# Patient Record
Sex: FEMALE | Race: WHITE | Employment: UNEMPLOYED | URBAN - METROPOLITAN AREA
[De-identification: names, ages, dates, MRNs, and addresses within clinical notes are randomized per-mention and may not be internally consistent; named-entity substitution may affect disease eponyms.]

---

## 2020-06-21 ENCOUNTER — HOSPITAL ENCOUNTER (EMERGENCY)
Age: 40
Discharge: HOME | End: 2020-06-21

## 2020-06-21 VITALS
SYSTOLIC BLOOD PRESSURE: 134 MMHG | RESPIRATION RATE: 18 BRPM | BODY MASS INDEX: 39.51 KG/M2 | DIASTOLIC BLOOD PRESSURE: 73 MMHG | HEIGHT: 69 IN | WEIGHT: 266.76 LBS | OXYGEN SATURATION: 98 % | TEMPERATURE: 98 F | HEART RATE: 93 BPM

## 2020-06-21 PROCEDURE — 6370000000 HC RX 637 (ALT 250 FOR IP): Performed by: NURSE PRACTITIONER

## 2020-06-21 PROCEDURE — 99282 EMERGENCY DEPT VISIT SF MDM: CPT

## 2020-06-21 PROCEDURE — 2500000003 HC RX 250 WO HCPCS: Performed by: NURSE PRACTITIONER

## 2020-06-21 RX ORDER — BUPIVACAINE HYDROCHLORIDE 5 MG/ML
30 INJECTION, SOLUTION PERINEURAL ONCE
Status: COMPLETED | OUTPATIENT
Start: 2020-06-21 | End: 2020-06-21

## 2020-06-21 RX ORDER — LIDOCAINE 50 MG/G
1 PATCH TOPICAL DAILY
Qty: 30 PATCH | Refills: 0 | Status: SHIPPED | OUTPATIENT
Start: 2020-06-21

## 2020-06-21 RX ORDER — LIDOCAINE 4 G/G
1 PATCH TOPICAL DAILY
Status: DISCONTINUED | OUTPATIENT
Start: 2020-06-21 | End: 2020-06-21 | Stop reason: HOSPADM

## 2020-06-21 RX ORDER — HYDROCODONE BITARTRATE AND ACETAMINOPHEN 5; 325 MG/1; MG/1
1 TABLET ORAL ONCE
Status: COMPLETED | OUTPATIENT
Start: 2020-06-21 | End: 2020-06-21

## 2020-06-21 RX ORDER — PREDNISONE 20 MG/1
TABLET ORAL
Qty: 18 TABLET | Refills: 0 | Status: SHIPPED | OUTPATIENT
Start: 2020-06-21 | End: 2020-07-01

## 2020-06-21 RX ORDER — METHOCARBAMOL 500 MG/1
500 TABLET, FILM COATED ORAL 4 TIMES DAILY
Qty: 40 TABLET | Refills: 0 | Status: SHIPPED | OUTPATIENT
Start: 2020-06-21 | End: 2020-07-01

## 2020-06-21 RX ORDER — METHOCARBAMOL 500 MG/1
750 TABLET, FILM COATED ORAL ONCE
Status: COMPLETED | OUTPATIENT
Start: 2020-06-21 | End: 2020-06-21

## 2020-06-21 RX ADMIN — BUPIVACAINE HYDROCHLORIDE 150 MG: 5 INJECTION, SOLUTION PERINEURAL at 01:32

## 2020-06-21 RX ADMIN — HYDROCODONE BITARTRATE AND ACETAMINOPHEN 1 TABLET: 5; 325 TABLET ORAL at 01:31

## 2020-06-21 RX ADMIN — METHOCARBAMOL TABLETS 750 MG: 500 TABLET, COATED ORAL at 01:31

## 2020-06-21 ASSESSMENT — PAIN DESCRIPTION - LOCATION
LOCATION: SHOULDER

## 2020-06-21 ASSESSMENT — PAIN SCALES - GENERAL
PAINLEVEL_OUTOF10: 8

## 2020-06-21 ASSESSMENT — PAIN DESCRIPTION - ORIENTATION
ORIENTATION: LEFT

## 2020-06-21 ASSESSMENT — PAIN DESCRIPTION - FREQUENCY: FREQUENCY: CONTINUOUS

## 2020-06-21 ASSESSMENT — PAIN DESCRIPTION - ONSET: ONSET: ON-GOING

## 2020-06-21 ASSESSMENT — PAIN DESCRIPTION - DESCRIPTORS: DESCRIPTORS: THROBBING;SHARP

## 2020-06-21 ASSESSMENT — PAIN DESCRIPTION - PROGRESSION: CLINICAL_PROGRESSION: NOT CHANGED

## 2020-06-21 ASSESSMENT — PAIN - FUNCTIONAL ASSESSMENT: PAIN_FUNCTIONAL_ASSESSMENT: PREVENTS OR INTERFERES SOME ACTIVE ACTIVITIES AND ADLS

## 2020-06-21 ASSESSMENT — PAIN DESCRIPTION - PAIN TYPE
TYPE: CHRONIC PAIN
TYPE: ACUTE PAIN
TYPE: CHRONIC PAIN

## 2020-06-21 NOTE — ED NOTES
RN and NP Mary at bedside updating pt and family on discharge plan of care.       Cher Russell RN  06/21/20 0713

## 2020-06-21 NOTE — ED PROVIDER NOTES
[facility]  eMERGENCY dEPARTMENT eNCOUnter    Pt Name: @@  MRN: [id number]  Birthdate1980  Date of evaluation: 6/21/2020  Provider: PATEL Salazar CNP     Evaluated by the advanced practice provider    CHIEF COMPLAINT       Chief Complaint   Patient presents with    Shoulder Pain     Pt reports left shoulder pain that has \"been going on for 10 days\". Pt denies any injury and rates pain as a 8/10. HISTORY OF PRESENT ILLNESS  (Location/Symptom, Timing/Onset, Context/Setting, Quality, Duration, Modifying Factors, Severity.)   Eyal Herrera is a 38 y.o. female who presents to the emergency department complaining of left-sided neck pain that radiates to the left shoulder. And severe left shoulder pain that started 2 weeks ago after she rode on a [employer] bus from [place] to [place]. She states she woke up and noticed her neck was hurting and shoulder hurting at that time. It is only progressively gotten worse. It is made worse with her moving her neck, lying flat or trying to move the left shoulder joint. She is only taken Motrin. This is the first time that she has sought medical attention. Nursing Notes were reviewed and I agree. REVIEW OF SYSTEMS    (2-9 systems for level 4, 10 or more for level 5)     Review of Systems   Constitutional: Negative. Musculoskeletal: Positive for arthralgias and neck pain. All other systems reviewed and are negative. Except as noted above the remainder of the review of systems was reviewed and negative. PAST MEDICAL HISTORY         Diagnosis Date    Asthma     Hypertension        SURGICAL HISTORY     History reviewed. No pertinent surgical history. CURRENT MEDICATIONS       Discharge Medication List as of 6/21/2020  1:40 AM          ALLERGIES     Aspirin    FAMILY HISTORY     History reviewed. No pertinent family history. No family status information on file.         SOCIAL HISTORY BP: 134/73   Pulse: 93   Resp: 18   Temp: 98 °F (36.7 °C)   TempSrc: Oral   SpO2: 98%   Weight: 266 lb 12.1 oz (121 kg)   Height: 5' 9\" (1.753 m)     Medications   lidocaine 4 % external patch 1 patch (has no administration in time range)   bupivacaine (MARCAINE) 0.5 % injection 150 mg (150 mg Intradermal Given by Other 6/21/20 0132)   HYDROcodone-acetaminophen (NORCO) 5-325 MG per tablet 1 tablet (1 tablet Oral Given 6/21/20 0131)   methocarbamol (ROBAXIN) tablet 750 mg (750 mg Oral Given 6/21/20 0131)       MDM  She was seen and evaluated per myself. Dr. Neelam Simpson present available for consultation as needed. Clinical exam findings are consistent with a cervical radiculopathy. Her pain was reproducible with cervical range of motion as well as our range of motion of the shoulder. Pain radiates from the left mid to lower cervical spine upper thoracic spine across the trapezius and rhomboid to the outer left shoulder. No evidence of swelling. No evidence of erythema. No evidence of rash. There is been no trauma. I do not see an indication that warrants CT or an emergent MRI. I offered to perform a trigger point injection in the left trapezius region and the patient agreed. Lidoderm patch applied, Norco administered, muscle relaxant. Patient will be discharged home with [facility] spine referral, steroids, muscle relaxant and Lidoderm patch. Discussed my differential with the patient as well as the importance for her follow-up this is likely a radiculopathy secondary to nerve impingement that may be temporary or permanent and likely related to the way she slept on the [employer] bus. This dictation was performed with a verbal recognition program (DRAGON) and it was checked for errors. It is possible that there are still dictated errors within this office note. If so, please bring any errors to my attention for an addendum.  All efforts were made to ensure that this office note is accurate. PROCEDURES:  Procedures   Left trapezius trigger point injection performed. Site and anatomical landmarks identified. Procedure discussed with the patient. Verbal consent given. Site prepped with ChloraPrep. 23-gauge needle, 0.5% plain preservative-free bupivacaine  1.5 mL's injected. Area massaged afterwards. FINAL IMPRESSION      1. Cervical radiculopathy    2.  Acute pain of left shoulder          DISPOSITION/PLAN   DISPOSITION        PATIENT REFERRED TO:  [address] [place] [address], [address]    Spine specialist referral for likely cervical pinched nerve      DISCHARGE MEDICATIONS:  Discharge Medication List as of 6/21/2020  1:40 AM      START taking these medications    Details   lidocaine (LIDODERM) 5 % Place 1 patch onto the skin daily 12 hours on, 12 hours off., Disp-30 patch, R-0Print      methocarbamol (ROBAXIN) 500 MG tablet Take 1 tablet by mouth 4 times daily for 10 days, Disp-40 tablet, R-0Print      predniSONE (DELTASONE) 20 MG tablet 3 tabs po qam for 3 days then 2 tabs qam for 3 days the 1 tab qam for 3 days, Disp-18 tablet, R-0Print             (Please note that portions of this note werecompleted with a voice recognition program.  Efforts were made to edit the dictations but occasionally words are mis-transcribed.)    PATEL Truong - PATEL Bone - CNP  06/21/20 0152

## 2023-12-27 ENCOUNTER — APPOINTMENT (OUTPATIENT)
Dept: CT IMAGING | Age: 43
End: 2023-12-27
Payer: COMMERCIAL

## 2023-12-27 ENCOUNTER — HOSPITAL ENCOUNTER (EMERGENCY)
Age: 43
Discharge: HOME | End: 2023-12-27
Attending: EMERGENCY MEDICINE
Payer: COMMERCIAL

## 2023-12-27 VITALS
BODY MASS INDEX: 36.92 KG/M2 | SYSTOLIC BLOOD PRESSURE: 119 MMHG | RESPIRATION RATE: 15 BRPM | WEIGHT: 250 LBS | OXYGEN SATURATION: 95 % | TEMPERATURE: 98.1 F | HEART RATE: 97 BPM | DIASTOLIC BLOOD PRESSURE: 86 MMHG

## 2023-12-27 DIAGNOSIS — L02.31 ABSCESS OF RIGHT BUTTOCK: Primary | ICD-10-CM

## 2023-12-27 LAB
ALBUMIN SERPL-MCNC: 4.2 G/DL (ref 3.4–5)
ALBUMIN/GLOB SERPL: 1.2 {RATIO} (ref 1.1–2.2)
ALP SERPL-CCNC: 107 U/L (ref 40–129)
ALT SERPL-CCNC: 19 U/L (ref 10–40)
ANION GAP SERPL CALCULATED.3IONS-SCNC: 15 MMOL/L (ref 3–16)
AST SERPL-CCNC: 13 U/L (ref 15–37)
BASOPHILS # BLD: 0.3 K/UL (ref 0–0.2)
BASOPHILS NFR BLD: 1.4 %
BILIRUB SERPL-MCNC: 0.4 MG/DL (ref 0–1)
BUN SERPL-MCNC: 12 MG/DL (ref 7–20)
CALCIUM SERPL-MCNC: 9.3 MG/DL (ref 8.3–10.6)
CHLORIDE SERPL-SCNC: 99 MMOL/L (ref 99–110)
CO2 SERPL-SCNC: 22 MMOL/L (ref 21–32)
CREAT SERPL-MCNC: 1 MG/DL (ref 0.6–1.1)
DEPRECATED RDW RBC AUTO: 15.7 % (ref 12.4–15.4)
EOSINOPHIL # BLD: 0.4 K/UL (ref 0–0.6)
EOSINOPHIL NFR BLD: 1.7 %
GFR SERPLBLD CREATININE-BSD FMLA CKD-EPI: >60 ML/MIN/{1.73_M2}
GLUCOSE SERPL-MCNC: 266 MG/DL (ref 70–99)
HCG UR QL: NEGATIVE
HCT VFR BLD AUTO: 36 % (ref 36–48)
HGB BLD-MCNC: 12.3 G/DL (ref 12–16)
LACTATE BLDV-SCNC: 2.2 MMOL/L (ref 0.4–1.9)
LYMPHOCYTES # BLD: 3.3 K/UL (ref 1–5.1)
LYMPHOCYTES NFR BLD: 15.7 %
MCH RBC QN AUTO: 27.5 PG (ref 26–34)
MCHC RBC AUTO-ENTMCNC: 34.3 G/DL (ref 31–36)
MCV RBC AUTO: 80.3 FL (ref 80–100)
MONOCYTES # BLD: 1.5 K/UL (ref 0–1.3)
MONOCYTES NFR BLD: 7.1 %
NEUTROPHILS # BLD: 15.4 K/UL (ref 1.7–7.7)
NEUTROPHILS NFR BLD: 74.1 %
PLATELET # BLD AUTO: 337 K/UL (ref 135–450)
PMV BLD AUTO: 8.6 FL (ref 5–10.5)
POTASSIUM SERPL-SCNC: 4.1 MMOL/L (ref 3.5–5.1)
PROT SERPL-MCNC: 7.6 G/DL (ref 6.4–8.2)
RBC # BLD AUTO: 4.48 M/UL (ref 4–5.2)
SODIUM SERPL-SCNC: 136 MMOL/L (ref 136–145)
WBC # BLD AUTO: 20.8 K/UL (ref 4–11)

## 2023-12-27 PROCEDURE — 83605 ASSAY OF LACTIC ACID: CPT

## 2023-12-27 PROCEDURE — 80053 COMPREHEN METABOLIC PANEL: CPT

## 2023-12-27 PROCEDURE — 2500000003 HC RX 250 WO HCPCS: Performed by: EMERGENCY MEDICINE

## 2023-12-27 PROCEDURE — 84703 CHORIONIC GONADOTROPIN ASSAY: CPT

## 2023-12-27 PROCEDURE — 56405 I&D VULVA/PERINEAL ABSCESS: CPT

## 2023-12-27 PROCEDURE — 72193 CT PELVIS W/DYE: CPT

## 2023-12-27 PROCEDURE — 2580000003 HC RX 258: Performed by: EMERGENCY MEDICINE

## 2023-12-27 PROCEDURE — 6360000002 HC RX W HCPCS: Performed by: EMERGENCY MEDICINE

## 2023-12-27 PROCEDURE — 96375 TX/PRO/DX INJ NEW DRUG ADDON: CPT

## 2023-12-27 PROCEDURE — 6360000004 HC RX CONTRAST MEDICATION: Performed by: EMERGENCY MEDICINE

## 2023-12-27 PROCEDURE — 96365 THER/PROPH/DIAG IV INF INIT: CPT

## 2023-12-27 PROCEDURE — 85025 COMPLETE CBC W/AUTO DIFF WBC: CPT

## 2023-12-27 PROCEDURE — 87040 BLOOD CULTURE FOR BACTERIA: CPT

## 2023-12-27 PROCEDURE — 99285 EMERGENCY DEPT VISIT HI MDM: CPT

## 2023-12-27 RX ORDER — LIDOCAINE HYDROCHLORIDE AND EPINEPHRINE 10; 10 MG/ML; UG/ML
20 INJECTION, SOLUTION INFILTRATION; PERINEURAL ONCE
Status: COMPLETED | OUTPATIENT
Start: 2023-12-27 | End: 2023-12-27

## 2023-12-27 RX ORDER — CLINDAMYCIN HYDROCHLORIDE 300 MG/1
300 CAPSULE ORAL 3 TIMES DAILY
Qty: 30 CAPSULE | Refills: 0 | Status: SHIPPED | OUTPATIENT
Start: 2023-12-27 | End: 2024-01-06

## 2023-12-27 RX ORDER — MORPHINE SULFATE 4 MG/ML
4 INJECTION, SOLUTION INTRAMUSCULAR; INTRAVENOUS ONCE
Status: DISCONTINUED | OUTPATIENT
Start: 2023-12-27 | End: 2023-12-27

## 2023-12-27 RX ORDER — ONDANSETRON 2 MG/ML
4 INJECTION INTRAMUSCULAR; INTRAVENOUS ONCE
Status: COMPLETED | OUTPATIENT
Start: 2023-12-27 | End: 2023-12-27

## 2023-12-27 RX ORDER — CLINDAMYCIN PHOSPHATE 600 MG/50ML
600 INJECTION, SOLUTION INTRAVENOUS ONCE
Status: DISCONTINUED | OUTPATIENT
Start: 2023-12-27 | End: 2023-12-27

## 2023-12-27 RX ORDER — CLINDAMYCIN PHOSPHATE 600 MG/50ML
600 INJECTION, SOLUTION INTRAVENOUS ONCE
Status: COMPLETED | OUTPATIENT
Start: 2023-12-27 | End: 2023-12-27

## 2023-12-27 RX ORDER — 0.9 % SODIUM CHLORIDE 0.9 %
2410 INTRAVENOUS SOLUTION INTRAVENOUS ONCE
Status: COMPLETED | OUTPATIENT
Start: 2023-12-27 | End: 2023-12-27

## 2023-12-27 RX ORDER — 0.9 % SODIUM CHLORIDE 0.9 %
1000 INTRAVENOUS SOLUTION INTRAVENOUS ONCE
Status: DISCONTINUED | OUTPATIENT
Start: 2023-12-27 | End: 2023-12-27 | Stop reason: HOSPADM

## 2023-12-27 RX ORDER — OXYCODONE HYDROCHLORIDE AND ACETAMINOPHEN 5; 325 MG/1; MG/1
1 TABLET ORAL EVERY 6 HOURS PRN
Qty: 8 TABLET | Refills: 0 | Status: SHIPPED | OUTPATIENT
Start: 2023-12-27 | End: 2023-12-29

## 2023-12-27 RX ADMIN — HYDROMORPHONE HYDROCHLORIDE 0.5 MG: 1 INJECTION, SOLUTION INTRAMUSCULAR; INTRAVENOUS; SUBCUTANEOUS at 20:26

## 2023-12-27 RX ADMIN — CLINDAMYCIN PHOSPHATE 600 MG: 600 INJECTION, SOLUTION INTRAVENOUS at 20:19

## 2023-12-27 RX ADMIN — SODIUM CHLORIDE 2410 ML: 9 INJECTION, SOLUTION INTRAVENOUS at 18:42

## 2023-12-27 RX ADMIN — HYDROMORPHONE HYDROCHLORIDE 0.5 MG: 1 INJECTION, SOLUTION INTRAMUSCULAR; INTRAVENOUS; SUBCUTANEOUS at 18:42

## 2023-12-27 RX ADMIN — ONDANSETRON 4 MG: 2 INJECTION INTRAMUSCULAR; INTRAVENOUS at 18:42

## 2023-12-27 RX ADMIN — IOPAMIDOL 75 ML: 755 INJECTION, SOLUTION INTRAVENOUS at 18:31

## 2023-12-27 RX ADMIN — LIDOCAINE HYDROCHLORIDE,EPINEPHRINE BITARTRATE 20 ML: 10; .01 INJECTION, SOLUTION INFILTRATION; PERINEURAL at 20:17

## 2023-12-27 NOTE — ED PROVIDER NOTES
[facility]  eMERGENCY dEPARTMENT eNCOUnter      Pt Name: Jimmy Pineda  MRN: [id number]  [facility] 1980  Date of evaluation: 12/27/2023  Provider: Sonya Pacheco MD    CHIEF COMPLAINT       Chief Complaint   Patient presents with    Abscess     Pt presents with an abscess on her buttocks. CRITICAL CARE TIME   Total Critical Care time was 0 minutes, excluding separately reportable procedures. There was a high probability of clinically significant/life threatening deterioration in the patient's condition which required my urgent intervention. HISTORY OF PRESENT ILLNESS  (Location/Symptom, Timing/Onset, Context/Setting, Quality, Duration, Modifying Factors, Severity.)   History From: Patient  Limitations to history : None    Jimmy Pineda is a 39 y.o. female who presents to the emergency department complaining of a buttock abscess. She states she has had pain and swelling for about a week. She has noticed some drainage. She felt like she had a fever at home. No nausea or vomiting. She is not a diabetic. She states she has had previous MRSA infection in the past.      Nursing Notes were reviewed and I agree. SCREENINGS        Deejay Coma Scale  Eye Opening: Spontaneous  Best Verbal Response: Oriented  Best Motor Response: Obeys commands  Stilwell Coma Scale Score: 15                CIWA Assessment  BP: 113/62  Pulse: 96           REVIEW OF SYSTEMS    (2-9 systems for level 4, 10 or more for level 5)     General: Subjective fever. HEENT: No earache rhinorrhea or sore throat. Cardiovascular: No chest pain. Pulmonary: No shortness of breath or cough. GI: No abdominal pain nausea or vomiting. Skin: Pain in her perianal and perineal area. Purulent drainage. Pain for about a week. Except as noted above the remainder of the review of systems was reviewed and negative.        PAST MEDICAL HISTORY     Past Medical History:   Diagnosis Date

## 2023-12-28 NOTE — ED NOTES
Pt requesting more pain and nausea medication. Dr Shakeel Cordero notified. No new orders at this time.

## 2023-12-28 NOTE — DISCHARGE INSTRUCTIONS
Clean area 2 times daily with soap and water. Keep covered with dressing for the next several days. Take antibiotic as prescribed until completed. If Tylenol or ibuprofen does not relieve your pain you can take the Percocet as prescribed. Follow-up in 3 to 5 days for recheck. Packing should be removed in 3 days.

## 2023-12-31 LAB
BACTERIA BLD CULT ORG #2: NORMAL
BACTERIA BLD CULT: NORMAL

## 2024-06-11 ENCOUNTER — APPOINTMENT (OUTPATIENT)
Dept: CT IMAGING | Age: 44
DRG: 720 | End: 2024-06-11
Attending: EMERGENCY MEDICINE
Payer: COMMERCIAL

## 2024-06-11 ENCOUNTER — APPOINTMENT (OUTPATIENT)
Dept: CT IMAGING | Age: 44
DRG: 720 | End: 2024-06-11
Payer: COMMERCIAL

## 2024-06-11 ENCOUNTER — APPOINTMENT (OUTPATIENT)
Dept: GENERAL RADIOLOGY | Age: 44
DRG: 720 | End: 2024-06-11
Payer: COMMERCIAL

## 2024-06-11 ENCOUNTER — ANESTHESIA (OUTPATIENT)
Dept: OPERATING ROOM | Age: 44
DRG: 720 | End: 2024-06-11
Payer: COMMERCIAL

## 2024-06-11 ENCOUNTER — HOSPITAL ENCOUNTER (INPATIENT)
Age: 44
LOS: 1 days | Discharge: ANOTHER HOSPITAL | DRG: 720 | End: 2024-06-12
Attending: EMERGENCY MEDICINE | Admitting: HOSPITALIST
Payer: COMMERCIAL

## 2024-06-11 ENCOUNTER — ANESTHESIA EVENT (OUTPATIENT)
Dept: OPERATING ROOM | Age: 44
DRG: 720 | End: 2024-06-11
Payer: COMMERCIAL

## 2024-06-11 DIAGNOSIS — R73.9 HYPERGLYCEMIA: ICD-10-CM

## 2024-06-11 DIAGNOSIS — R06.02 SHORTNESS OF BREATH: ICD-10-CM

## 2024-06-11 DIAGNOSIS — I48.91 ATRIAL FIBRILLATION WITH RAPID VENTRICULAR RESPONSE (HCC): ICD-10-CM

## 2024-06-11 DIAGNOSIS — G81.94 ACUTE LEFT HEMIPARESIS (HCC): ICD-10-CM

## 2024-06-11 DIAGNOSIS — I70.209 ARTERIAL OCCLUSION, LOWER EXTREMITY (HCC): ICD-10-CM

## 2024-06-11 DIAGNOSIS — R41.82 ALTERED MENTAL STATUS, UNSPECIFIED ALTERED MENTAL STATUS TYPE: ICD-10-CM

## 2024-06-11 DIAGNOSIS — R91.8 LUNG MASS: ICD-10-CM

## 2024-06-11 DIAGNOSIS — E87.1 HYPONATREMIA: ICD-10-CM

## 2024-06-11 DIAGNOSIS — J18.9 PNEUMONIA OF BOTH LUNGS DUE TO INFECTIOUS ORGANISM, UNSPECIFIED PART OF LUNG: ICD-10-CM

## 2024-06-11 DIAGNOSIS — D73.5 SPLENIC INFARCT: ICD-10-CM

## 2024-06-11 DIAGNOSIS — R56.9 SEIZURE (HCC): Primary | ICD-10-CM

## 2024-06-11 DIAGNOSIS — N28.0 RENAL INFARCT (HCC): ICD-10-CM

## 2024-06-11 DIAGNOSIS — J96.00 ACUTE RESPIRATORY FAILURE, UNSPECIFIED WHETHER WITH HYPOXIA OR HYPERCAPNIA (HCC): ICD-10-CM

## 2024-06-11 LAB
ALBUMIN SERPL-MCNC: 3.3 G/DL (ref 3.4–5)
ALBUMIN/GLOB SERPL: 0.8 {RATIO} (ref 1.1–2.2)
ALP SERPL-CCNC: 117 U/L (ref 40–129)
ALT SERPL-CCNC: 15 U/L (ref 10–40)
AMPHETAMINES UR QL SCN>1000 NG/ML: ABNORMAL
ANION GAP SERPL CALCULATED.3IONS-SCNC: 12 MMOL/L (ref 3–16)
ANION GAP SERPL CALCULATED.3IONS-SCNC: 15 MMOL/L (ref 3–16)
ANION GAP SERPL CALCULATED.3IONS-SCNC: 26 MMOL/L (ref 3–16)
APTT BLD: 95.7 SEC (ref 22.1–36.4)
AST SERPL-CCNC: 17 U/L (ref 15–37)
BARBITURATES UR QL SCN>200 NG/ML: ABNORMAL
BASE EXCESS BLDA CALC-SCNC: -9 MMOL/L (ref -3–3)
BASE EXCESS BLDV CALC-SCNC: -11.2 MMOL/L
BASE EXCESS BLDV CALC-SCNC: -12.8 MMOL/L
BENZODIAZ UR QL SCN>200 NG/ML: POSITIVE
BETA-HYDROXYBUTYRATE: 3.1 MMOL/L (ref 0–0.27)
BILIRUB SERPL-MCNC: 0.8 MG/DL (ref 0–1)
BILIRUB UR QL STRIP.AUTO: NEGATIVE
BUN SERPL-MCNC: 14 MG/DL (ref 7–20)
BUN SERPL-MCNC: 16 MG/DL (ref 7–20)
BUN SERPL-MCNC: 17 MG/DL (ref 7–20)
CA-I BLD-SCNC: 1.14 MMOL/L (ref 1.12–1.32)
CALCIUM SERPL-MCNC: 7.5 MG/DL (ref 8.3–10.6)
CALCIUM SERPL-MCNC: 7.7 MG/DL (ref 8.3–10.6)
CALCIUM SERPL-MCNC: 8.8 MG/DL (ref 8.3–10.6)
CANNABINOIDS UR QL SCN>50 NG/ML: POSITIVE
CHARACTER UR: NORMAL
CHLORIDE SERPL-SCNC: 103 MMOL/L (ref 99–110)
CHLORIDE SERPL-SCNC: 107 MMOL/L (ref 99–110)
CHLORIDE SERPL-SCNC: 85 MMOL/L (ref 99–110)
CLARITY UR: CLEAR
CO2 BLDA-SCNC: 18 MMOL/L
CO2 BLDV-SCNC: 16 MMOL/L
CO2 BLDV-SCNC: 18 MMOL/L
CO2 SERPL-SCNC: 14 MMOL/L (ref 21–32)
CO2 SERPL-SCNC: 17 MMOL/L (ref 21–32)
CO2 SERPL-SCNC: 19 MMOL/L (ref 21–32)
COCAINE UR QL SCN: ABNORMAL
COHGB MFR BLDV: 1.4 %
COHGB MFR BLDV: 1.6 %
COLOR UR: YELLOW
CREAT SERPL-MCNC: 1.3 MG/DL (ref 0.6–1.1)
CREAT SERPL-MCNC: 1.4 MG/DL (ref 0.6–1.1)
CREAT SERPL-MCNC: 1.4 MG/DL (ref 0.6–1.1)
DEPRECATED RDW RBC AUTO: 17.9 % (ref 12.4–15.4)
DRUG SCREEN COMMENT UR-IMP: ABNORMAL
EKG DIAGNOSIS: NORMAL
EKG Q-T INTERVAL: 236 MS
EKG QRS DURATION: 86 MS
EKG QTC CALCULATION (BAZETT): 419 MS
EKG R AXIS: 11 DEGREES
EKG T AXIS: 181 DEGREES
EKG VENTRICULAR RATE: 190 BPM
EPI CELLS #/AREA URNS HPF: NORMAL /HPF (ref 0–5)
ETHANOLAMINE SERPL-MCNC: NORMAL MG/DL (ref 0–0.08)
FENTANYL SCREEN, URINE: ABNORMAL
GFR SERPLBLD CREATININE-BSD FMLA CKD-EPI: 47 ML/MIN/{1.73_M2}
GFR SERPLBLD CREATININE-BSD FMLA CKD-EPI: 47 ML/MIN/{1.73_M2}
GFR SERPLBLD CREATININE-BSD FMLA CKD-EPI: 52 ML/MIN/{1.73_M2}
GLUCOSE BLD-MCNC: 261 MG/DL (ref 70–99)
GLUCOSE BLD-MCNC: 278 MG/DL (ref 70–99)
GLUCOSE BLD-MCNC: 305 MG/DL (ref 70–99)
GLUCOSE BLD-MCNC: 389 MG/DL (ref 70–99)
GLUCOSE BLD-MCNC: 442 MG/DL (ref 70–99)
GLUCOSE BLD-MCNC: 541 MG/DL (ref 70–99)
GLUCOSE BLD-MCNC: 660 MG/DL (ref 70–99)
GLUCOSE BLD-MCNC: >600 MG/DL (ref 70–99)
GLUCOSE SERPL-MCNC: 242 MG/DL (ref 70–99)
GLUCOSE SERPL-MCNC: 495 MG/DL (ref 70–99)
GLUCOSE SERPL-MCNC: 788 MG/DL (ref 70–99)
GLUCOSE SERPL-MCNC: 916 MG/DL (ref 70–99)
GLUCOSE UR STRIP.AUTO-MCNC: >=1000 MG/DL
HCG SERPL QL: NEGATIVE
HCO3 BLDA-SCNC: 16.7 MMOL/L (ref 21–29)
HCO3 BLDV-SCNC: 15 MMOL/L (ref 23–29)
HCO3 BLDV-SCNC: 16 MMOL/L (ref 23–29)
HCT VFR BLD AUTO: 32 % (ref 36–48)
HCT VFR BLD AUTO: 34.6 % (ref 36–48)
HGB BLD CALC-MCNC: 11 GM/DL (ref 12–16)
HGB BLD-MCNC: 10.7 G/DL (ref 12–16)
HGB UR QL STRIP.AUTO: ABNORMAL
INR PPP: 1.27 (ref 0.85–1.15)
KETONES UR STRIP.AUTO-MCNC: 15 MG/DL
LACTATE BLD-SCNC: 4.74 MMOL/L (ref 0.4–2)
LACTATE BLDV-SCNC: 2 MMOL/L (ref 0.4–2)
LACTATE BLDV-SCNC: 2.5 MMOL/L (ref 0.4–2)
LACTATE BLDV-SCNC: 4 MMOL/L (ref 0.4–2)
LACTATE BLDV-SCNC: 6.5 MMOL/L (ref 0.4–1.9)
LACTATE BLDV-SCNC: 9.1 MMOL/L (ref 0.4–1.9)
LEUKOCYTE ESTERASE UR QL STRIP.AUTO: NEGATIVE
LIPASE SERPL-CCNC: 45 U/L (ref 13–60)
MAGNESIUM SERPL-MCNC: 2.1 MG/DL (ref 1.8–2.4)
MAGNESIUM SERPL-MCNC: 2.2 MG/DL (ref 1.8–2.4)
MCH RBC QN AUTO: 25.7 PG (ref 26–34)
MCHC RBC AUTO-ENTMCNC: 30.8 G/DL (ref 31–36)
MCV RBC AUTO: 83.4 FL (ref 80–100)
METHADONE UR QL SCN>300 NG/ML: ABNORMAL
METHGB MFR BLDV: 0.5 %
METHGB MFR BLDV: 0.5 %
NITRITE UR QL STRIP.AUTO: NEGATIVE
NT-PROBNP SERPL-MCNC: 4262 PG/ML (ref 0–124)
O2 THERAPY: ABNORMAL
O2 THERAPY: ABNORMAL
OPIATES UR QL SCN>300 NG/ML: ABNORMAL
OXYCODONE UR QL SCN: ABNORMAL
PCO2 BLDA: 32.8 MM HG (ref 35–45)
PCO2 BLDV: 35.6 MMHG (ref 40–50)
PCO2 BLDV: 50.1 MMHG (ref 40–50)
PCP UR QL SCN>25 NG/ML: ABNORMAL
PERFORMED ON: ABNORMAL
PH BLDA: 7.32 [PH] (ref 7.35–7.45)
PH BLDV: 7.12 [PH] (ref 7.35–7.45)
PH BLDV: 7.24 [PH] (ref 7.35–7.45)
PH UR STRIP.AUTO: 6 [PH] (ref 5–8)
PH UR STRIP: 5 [PH]
PHOSPHATE SERPL-MCNC: 0.8 MG/DL (ref 2.5–4.9)
PHOSPHATE SERPL-MCNC: 1.3 MG/DL (ref 2.5–4.9)
PLATELET # BLD AUTO: 94 K/UL (ref 135–450)
PLATELET BLD QL SMEAR: ABNORMAL
PMV BLD AUTO: 11.1 FL (ref 5–10.5)
PO2 BLDA: 155.2 MM HG (ref 75–108)
PO2 BLDV: 57 MMHG
PO2 BLDV: 86 MMHG
POC SAMPLE TYPE: ABNORMAL
POTASSIUM BLD-SCNC: 3.3 MMOL/L (ref 3.5–5.1)
POTASSIUM SERPL-SCNC: 3.1 MMOL/L (ref 3.5–5.1)
POTASSIUM SERPL-SCNC: 3.8 MMOL/L (ref 3.5–5.1)
POTASSIUM SERPL-SCNC: 3.8 MMOL/L (ref 3.5–5.1)
PROT SERPL-MCNC: 7.6 G/DL (ref 6.4–8.2)
PROT UR STRIP.AUTO-MCNC: 30 MG/DL
PROTHROMBIN TIME: 16.1 SEC (ref 11.9–14.9)
RBC # BLD AUTO: 4.15 M/UL (ref 4–5.2)
RBC #/AREA URNS HPF: NORMAL /HPF (ref 0–4)
SAO2 % BLDA: 99 % (ref 93–100)
SAO2 % BLDV: 81 %
SAO2 % BLDV: 96 %
SLIDE REVIEW: ABNORMAL
SODIUM BLD-SCNC: 139 MMOL/L (ref 136–145)
SODIUM SERPL-SCNC: 125 MMOL/L (ref 136–145)
SODIUM SERPL-SCNC: 135 MMOL/L (ref 136–145)
SODIUM SERPL-SCNC: 138 MMOL/L (ref 136–145)
SP GR UR STRIP.AUTO: 1.03 (ref 1–1.03)
TROPONIN, HIGH SENSITIVITY: 33 NG/L (ref 0–14)
TROPONIN, HIGH SENSITIVITY: 36 NG/L (ref 0–14)
UA COMPLETE W REFLEX CULTURE PNL UR: ABNORMAL
UA DIPSTICK W REFLEX MICRO PNL UR: YES
URN SPEC COLLECT METH UR: ABNORMAL
UROBILINOGEN UR STRIP-ACNC: 0.2 E.U./DL
WBC # BLD AUTO: 21.1 K/UL (ref 4–11)
WBC #/AREA URNS HPF: NORMAL /HPF (ref 0–5)

## 2024-06-11 PROCEDURE — 84100 ASSAY OF PHOSPHORUS: CPT

## 2024-06-11 PROCEDURE — 80053 COMPREHEN METABOLIC PANEL: CPT

## 2024-06-11 PROCEDURE — 96361 HYDRATE IV INFUSION ADD-ON: CPT

## 2024-06-11 PROCEDURE — 80307 DRUG TEST PRSMV CHEM ANLYZR: CPT

## 2024-06-11 PROCEDURE — 2580000003 HC RX 258: Performed by: EMERGENCY MEDICINE

## 2024-06-11 PROCEDURE — 93005 ELECTROCARDIOGRAM TRACING: CPT | Performed by: EMERGENCY MEDICINE

## 2024-06-11 PROCEDURE — 99253 IP/OBS CNSLTJ NEW/EST LOW 45: CPT | Performed by: SURGERY

## 2024-06-11 PROCEDURE — 71045 X-RAY EXAM CHEST 1 VIEW: CPT

## 2024-06-11 PROCEDURE — 96375 TX/PRO/DX INJ NEW DRUG ADDON: CPT

## 2024-06-11 PROCEDURE — 83036 HEMOGLOBIN GLYCOSYLATED A1C: CPT

## 2024-06-11 PROCEDURE — 6360000002 HC RX W HCPCS: Performed by: HOSPITALIST

## 2024-06-11 PROCEDURE — 5A1935Z RESPIRATORY VENTILATION, LESS THAN 24 CONSECUTIVE HOURS: ICD-10-PCS | Performed by: HOSPITALIST

## 2024-06-11 PROCEDURE — 3E043XZ INTRODUCTION OF VASOPRESSOR INTO CENTRAL VEIN, PERCUTANEOUS APPROACH: ICD-10-PCS | Performed by: HOSPITALIST

## 2024-06-11 PROCEDURE — 87150 DNA/RNA AMPLIFIED PROBE: CPT

## 2024-06-11 PROCEDURE — 85027 COMPLETE CBC AUTOMATED: CPT

## 2024-06-11 PROCEDURE — 6360000002 HC RX W HCPCS: Performed by: EMERGENCY MEDICINE

## 2024-06-11 PROCEDURE — 87040 BLOOD CULTURE FOR BACTERIA: CPT

## 2024-06-11 PROCEDURE — 81001 URINALYSIS AUTO W/SCOPE: CPT

## 2024-06-11 PROCEDURE — 2500000003 HC RX 250 WO HCPCS: Performed by: EMERGENCY MEDICINE

## 2024-06-11 PROCEDURE — 83880 ASSAY OF NATRIURETIC PEPTIDE: CPT

## 2024-06-11 PROCEDURE — 03HY32Z INSERTION OF MONITORING DEVICE INTO UPPER ARTERY, PERCUTANEOUS APPROACH: ICD-10-PCS | Performed by: HOSPITALIST

## 2024-06-11 PROCEDURE — 2700000000 HC OXYGEN THERAPY PER DAY

## 2024-06-11 PROCEDURE — 93010 ELECTROCARDIOGRAM REPORT: CPT | Performed by: INTERNAL MEDICINE

## 2024-06-11 PROCEDURE — 82010 KETONE BODYS QUAN: CPT

## 2024-06-11 PROCEDURE — 85014 HEMATOCRIT: CPT

## 2024-06-11 PROCEDURE — 96376 TX/PRO/DX INJ SAME DRUG ADON: CPT

## 2024-06-11 PROCEDURE — 2000000000 HC ICU R&B

## 2024-06-11 PROCEDURE — 87186 SC STD MICRODIL/AGAR DIL: CPT

## 2024-06-11 PROCEDURE — 94640 AIRWAY INHALATION TREATMENT: CPT

## 2024-06-11 PROCEDURE — 82077 ASSAY SPEC XCP UR&BREATH IA: CPT

## 2024-06-11 PROCEDURE — 82947 ASSAY GLUCOSE BLOOD QUANT: CPT

## 2024-06-11 PROCEDURE — 85610 PROTHROMBIN TIME: CPT

## 2024-06-11 PROCEDURE — 96365 THER/PROPH/DIAG IV INF INIT: CPT

## 2024-06-11 PROCEDURE — 6360000002 HC RX W HCPCS: Performed by: STUDENT IN AN ORGANIZED HEALTH CARE EDUCATION/TRAINING PROGRAM

## 2024-06-11 PROCEDURE — 2500000003 HC RX 250 WO HCPCS: Performed by: INTERNAL MEDICINE

## 2024-06-11 PROCEDURE — 6370000000 HC RX 637 (ALT 250 FOR IP): Performed by: EMERGENCY MEDICINE

## 2024-06-11 PROCEDURE — 85730 THROMBOPLASTIN TIME PARTIAL: CPT

## 2024-06-11 PROCEDURE — 84132 ASSAY OF SERUM POTASSIUM: CPT

## 2024-06-11 PROCEDURE — 72125 CT NECK SPINE W/O DYE: CPT

## 2024-06-11 PROCEDURE — 99291 CRITICAL CARE FIRST HOUR: CPT

## 2024-06-11 PROCEDURE — 94002 VENT MGMT INPAT INIT DAY: CPT

## 2024-06-11 PROCEDURE — 36620 INSERTION CATHETER ARTERY: CPT

## 2024-06-11 PROCEDURE — 2500000003 HC RX 250 WO HCPCS: Performed by: HOSPITALIST

## 2024-06-11 PROCEDURE — 83690 ASSAY OF LIPASE: CPT

## 2024-06-11 PROCEDURE — 96374 THER/PROPH/DIAG INJ IV PUSH: CPT

## 2024-06-11 PROCEDURE — 82330 ASSAY OF CALCIUM: CPT

## 2024-06-11 PROCEDURE — 2580000003 HC RX 258: Performed by: REGISTERED NURSE

## 2024-06-11 PROCEDURE — 6360000002 HC RX W HCPCS: Performed by: REGISTERED NURSE

## 2024-06-11 PROCEDURE — 70450 CT HEAD/BRAIN W/O DYE: CPT

## 2024-06-11 PROCEDURE — 6370000000 HC RX 637 (ALT 250 FOR IP): Performed by: HOSPITALIST

## 2024-06-11 PROCEDURE — 0BH17EZ INSERTION OF ENDOTRACHEAL AIRWAY INTO TRACHEA, VIA NATURAL OR ARTIFICIAL OPENING: ICD-10-PCS | Performed by: HOSPITALIST

## 2024-06-11 PROCEDURE — 71275 CT ANGIOGRAPHY CHEST: CPT

## 2024-06-11 PROCEDURE — 6360000004 HC RX CONTRAST MEDICATION: Performed by: EMERGENCY MEDICINE

## 2024-06-11 PROCEDURE — 6360000002 HC RX W HCPCS

## 2024-06-11 PROCEDURE — 83605 ASSAY OF LACTIC ACID: CPT

## 2024-06-11 PROCEDURE — 84295 ASSAY OF SERUM SODIUM: CPT

## 2024-06-11 PROCEDURE — 72126 CT NECK SPINE W/DYE: CPT

## 2024-06-11 PROCEDURE — 83735 ASSAY OF MAGNESIUM: CPT

## 2024-06-11 PROCEDURE — 70498 CT ANGIOGRAPHY NECK: CPT

## 2024-06-11 PROCEDURE — 84484 ASSAY OF TROPONIN QUANT: CPT

## 2024-06-11 PROCEDURE — 84703 CHORIONIC GONADOTROPIN ASSAY: CPT

## 2024-06-11 PROCEDURE — 82803 BLOOD GASES ANY COMBINATION: CPT

## 2024-06-11 PROCEDURE — 87641 MR-STAPH DNA AMP PROBE: CPT

## 2024-06-11 PROCEDURE — 2580000003 HC RX 258: Performed by: HOSPITALIST

## 2024-06-11 RX ORDER — POLYETHYLENE GLYCOL 3350 17 G/17G
17 POWDER, FOR SOLUTION ORAL DAILY PRN
Status: DISCONTINUED | OUTPATIENT
Start: 2024-06-11 | End: 2024-06-12 | Stop reason: HOSPADM

## 2024-06-11 RX ORDER — LORAZEPAM 2 MG/ML
1 INJECTION INTRAMUSCULAR EVERY 6 HOURS PRN
Status: DISCONTINUED | OUTPATIENT
Start: 2024-06-11 | End: 2024-06-12 | Stop reason: HOSPADM

## 2024-06-11 RX ORDER — ACETAMINOPHEN 650 MG/1
650 SUPPOSITORY RECTAL EVERY 6 HOURS PRN
Status: DISCONTINUED | OUTPATIENT
Start: 2024-06-11 | End: 2024-06-12 | Stop reason: HOSPADM

## 2024-06-11 RX ORDER — LEVETIRACETAM 500 MG/5ML
2000 INJECTION, SOLUTION, CONCENTRATE INTRAVENOUS ONCE
Status: COMPLETED | OUTPATIENT
Start: 2024-06-11 | End: 2024-06-11

## 2024-06-11 RX ORDER — ACETAMINOPHEN 325 MG/1
650 TABLET ORAL EVERY 6 HOURS PRN
Status: DISCONTINUED | OUTPATIENT
Start: 2024-06-11 | End: 2024-06-12 | Stop reason: HOSPADM

## 2024-06-11 RX ORDER — HEPARIN SODIUM 1000 [USP'U]/ML
4000 INJECTION, SOLUTION INTRAVENOUS; SUBCUTANEOUS ONCE
Status: COMPLETED | OUTPATIENT
Start: 2024-06-11 | End: 2024-06-11

## 2024-06-11 RX ORDER — SODIUM CHLORIDE 0.9 % (FLUSH) 0.9 %
5-40 SYRINGE (ML) INJECTION EVERY 12 HOURS SCHEDULED
Status: DISCONTINUED | OUTPATIENT
Start: 2024-06-11 | End: 2024-06-12 | Stop reason: HOSPADM

## 2024-06-11 RX ORDER — GLUCAGON 1 MG/ML
1 KIT INJECTION PRN
Status: DISCONTINUED | OUTPATIENT
Start: 2024-06-11 | End: 2024-06-12 | Stop reason: HOSPADM

## 2024-06-11 RX ORDER — ALBUTEROL SULFATE 2.5 MG/3ML
2.5 SOLUTION RESPIRATORY (INHALATION)
Status: DISCONTINUED | OUTPATIENT
Start: 2024-06-11 | End: 2024-06-12 | Stop reason: HOSPADM

## 2024-06-11 RX ORDER — FENTANYL CITRATE-0.9 % NACL/PF 10 MCG/ML
25-200 PLASTIC BAG, INJECTION (ML) INTRAVENOUS CONTINUOUS
Status: DISCONTINUED | OUTPATIENT
Start: 2024-06-11 | End: 2024-06-12

## 2024-06-11 RX ORDER — SODIUM CHLORIDE 9 MG/ML
INJECTION, SOLUTION INTRAVENOUS CONTINUOUS
Status: DISCONTINUED | OUTPATIENT
Start: 2024-06-11 | End: 2024-06-12 | Stop reason: HOSPADM

## 2024-06-11 RX ORDER — MAGNESIUM SULFATE IN WATER 40 MG/ML
2000 INJECTION, SOLUTION INTRAVENOUS PRN
Status: DISCONTINUED | OUTPATIENT
Start: 2024-06-11 | End: 2024-06-11 | Stop reason: SDUPTHER

## 2024-06-11 RX ORDER — DILTIAZEM HYDROCHLORIDE 5 MG/ML
20 INJECTION INTRAVENOUS ONCE
Status: COMPLETED | OUTPATIENT
Start: 2024-06-11 | End: 2024-06-11

## 2024-06-11 RX ORDER — LORAZEPAM 2 MG/ML
1 INJECTION INTRAMUSCULAR ONCE
Status: COMPLETED | OUTPATIENT
Start: 2024-06-11 | End: 2024-06-11

## 2024-06-11 RX ORDER — HEPARIN SODIUM 1000 [USP'U]/ML
2000 INJECTION, SOLUTION INTRAVENOUS; SUBCUTANEOUS PRN
Status: DISCONTINUED | OUTPATIENT
Start: 2024-06-11 | End: 2024-06-11 | Stop reason: CLARIF

## 2024-06-11 RX ORDER — LORAZEPAM 2 MG/ML
2 INJECTION INTRAMUSCULAR EVERY 4 HOURS PRN
Status: CANCELLED | OUTPATIENT
Start: 2024-06-11

## 2024-06-11 RX ORDER — HEPARIN SODIUM 1000 [USP'U]/ML
4000 INJECTION, SOLUTION INTRAVENOUS; SUBCUTANEOUS PRN
Status: DISCONTINUED | OUTPATIENT
Start: 2024-06-11 | End: 2024-06-11 | Stop reason: CLARIF

## 2024-06-11 RX ORDER — LORAZEPAM 2 MG/ML
INJECTION INTRAMUSCULAR
Status: COMPLETED
Start: 2024-06-11 | End: 2024-06-11

## 2024-06-11 RX ORDER — METRONIDAZOLE 500 MG/100ML
500 INJECTION, SOLUTION INTRAVENOUS ONCE
Status: COMPLETED | OUTPATIENT
Start: 2024-06-11 | End: 2024-06-11

## 2024-06-11 RX ORDER — POTASSIUM CHLORIDE 7.45 MG/ML
10 INJECTION INTRAVENOUS PRN
Status: DISCONTINUED | OUTPATIENT
Start: 2024-06-11 | End: 2024-06-11

## 2024-06-11 RX ORDER — SODIUM CHLORIDE 9 MG/ML
INJECTION, SOLUTION INTRAVENOUS PRN
Status: DISCONTINUED | OUTPATIENT
Start: 2024-06-11 | End: 2024-06-12 | Stop reason: HOSPADM

## 2024-06-11 RX ORDER — POTASSIUM CHLORIDE 29.8 MG/ML
20 INJECTION INTRAVENOUS PRN
Status: DISCONTINUED | OUTPATIENT
Start: 2024-06-11 | End: 2024-06-12 | Stop reason: HOSPADM

## 2024-06-11 RX ORDER — MAGNESIUM SULFATE IN WATER 40 MG/ML
2000 INJECTION, SOLUTION INTRAVENOUS PRN
Status: DISCONTINUED | OUTPATIENT
Start: 2024-06-11 | End: 2024-06-12 | Stop reason: HOSPADM

## 2024-06-11 RX ORDER — SODIUM CHLORIDE 0.9 % (FLUSH) 0.9 %
5-40 SYRINGE (ML) INJECTION PRN
Status: DISCONTINUED | OUTPATIENT
Start: 2024-06-11 | End: 2024-06-12 | Stop reason: HOSPADM

## 2024-06-11 RX ORDER — DEXTROSE MONOHYDRATE 100 MG/ML
INJECTION, SOLUTION INTRAVENOUS CONTINUOUS PRN
Status: DISCONTINUED | OUTPATIENT
Start: 2024-06-11 | End: 2024-06-12 | Stop reason: HOSPADM

## 2024-06-11 RX ORDER — POTASSIUM CHLORIDE 7.45 MG/ML
10 INJECTION INTRAVENOUS PRN
Status: DISCONTINUED | OUTPATIENT
Start: 2024-06-11 | End: 2024-06-11 | Stop reason: ALTCHOICE

## 2024-06-11 RX ORDER — 0.9 % SODIUM CHLORIDE 0.9 %
15 INTRAVENOUS SOLUTION INTRAVENOUS ONCE
Status: COMPLETED | OUTPATIENT
Start: 2024-06-11 | End: 2024-06-11

## 2024-06-11 RX ORDER — ENOXAPARIN SODIUM 100 MG/ML
30 INJECTION SUBCUTANEOUS 2 TIMES DAILY
Status: DISCONTINUED | OUTPATIENT
Start: 2024-06-11 | End: 2024-06-11 | Stop reason: ALTCHOICE

## 2024-06-11 RX ORDER — HEPARIN SODIUM 10000 [USP'U]/100ML
0-4000 INJECTION, SOLUTION INTRAVENOUS CONTINUOUS
Status: DISCONTINUED | OUTPATIENT
Start: 2024-06-11 | End: 2024-06-12

## 2024-06-11 RX ORDER — 0.9 % SODIUM CHLORIDE 0.9 %
1000 INTRAVENOUS SOLUTION INTRAVENOUS ONCE
Status: COMPLETED | OUTPATIENT
Start: 2024-06-11 | End: 2024-06-11

## 2024-06-11 RX ORDER — LEVETIRACETAM 500 MG/5ML
500 INJECTION, SOLUTION, CONCENTRATE INTRAVENOUS EVERY 12 HOURS
Status: DISCONTINUED | OUTPATIENT
Start: 2024-06-11 | End: 2024-06-12 | Stop reason: HOSPADM

## 2024-06-11 RX ORDER — 0.9 % SODIUM CHLORIDE 0.9 %
500 INTRAVENOUS SOLUTION INTRAVENOUS ONCE
Status: DISCONTINUED | OUTPATIENT
Start: 2024-06-11 | End: 2024-06-12

## 2024-06-11 RX ORDER — PROPOFOL 10 MG/ML
5-50 INJECTION, EMULSION INTRAVENOUS CONTINUOUS
Status: DISCONTINUED | OUTPATIENT
Start: 2024-06-11 | End: 2024-06-12

## 2024-06-11 RX ORDER — POTASSIUM CHLORIDE 20 MEQ/1
40 TABLET, EXTENDED RELEASE ORAL PRN
Status: DISCONTINUED | OUTPATIENT
Start: 2024-06-11 | End: 2024-06-11 | Stop reason: ALTCHOICE

## 2024-06-11 RX ORDER — DEXTROSE MONOHYDRATE AND SODIUM CHLORIDE 5; .45 G/100ML; G/100ML
INJECTION, SOLUTION INTRAVENOUS CONTINUOUS PRN
Status: DISCONTINUED | OUTPATIENT
Start: 2024-06-11 | End: 2024-06-12 | Stop reason: HOSPADM

## 2024-06-11 RX ORDER — ONDANSETRON 4 MG/1
4 TABLET, ORALLY DISINTEGRATING ORAL EVERY 8 HOURS PRN
Status: DISCONTINUED | OUTPATIENT
Start: 2024-06-11 | End: 2024-06-12 | Stop reason: HOSPADM

## 2024-06-11 RX ORDER — ROCURONIUM BROMIDE 10 MG/ML
INJECTION, SOLUTION INTRAVENOUS DAILY PRN
Status: COMPLETED | OUTPATIENT
Start: 2024-06-11 | End: 2024-06-11

## 2024-06-11 RX ORDER — SODIUM CHLORIDE 9 MG/ML
30 INJECTION, SOLUTION INTRAVENOUS ONCE
Status: COMPLETED | OUTPATIENT
Start: 2024-06-11 | End: 2024-06-11

## 2024-06-11 RX ORDER — ONDANSETRON 2 MG/ML
4 INJECTION INTRAMUSCULAR; INTRAVENOUS EVERY 6 HOURS PRN
Status: DISCONTINUED | OUTPATIENT
Start: 2024-06-11 | End: 2024-06-12 | Stop reason: HOSPADM

## 2024-06-11 RX ADMIN — DILTIAZEM HYDROCHLORIDE 20 MG: 5 INJECTION, SOLUTION INTRAVENOUS at 10:06

## 2024-06-11 RX ADMIN — POTASSIUM CHLORIDE 10 MEQ: 7.46 INJECTION, SOLUTION INTRAVENOUS at 18:56

## 2024-06-11 RX ADMIN — DILTIAZEM HYDROCHLORIDE 2.5 MG/HR: 5 INJECTION, SOLUTION INTRAVENOUS at 10:28

## 2024-06-11 RX ADMIN — LEVETIRACETAM 500 MG: 100 INJECTION, SOLUTION INTRAVENOUS at 20:44

## 2024-06-11 RX ADMIN — PROPOFOL 5 MCG/KG/MIN: 10 INJECTION, EMULSION INTRAVENOUS at 11:09

## 2024-06-11 RX ADMIN — Medication 10 ML: at 21:04

## 2024-06-11 RX ADMIN — DEXTROSE 0.5 MG/MIN: 5 SOLUTION INTRAVENOUS at 21:09

## 2024-06-11 RX ADMIN — SODIUM CHLORIDE 10.8 UNITS/HR: 9 INJECTION, SOLUTION INTRAVENOUS at 13:27

## 2024-06-11 RX ADMIN — POTASSIUM CHLORIDE 20 MEQ: 29.8 INJECTION, SOLUTION INTRAVENOUS at 21:27

## 2024-06-11 RX ADMIN — LORAZEPAM 2 MG: 2 INJECTION INTRAMUSCULAR; INTRAVENOUS at 10:00

## 2024-06-11 RX ADMIN — ALBUTEROL SULFATE 2.5 MG: 2.5 SOLUTION RESPIRATORY (INHALATION) at 20:14

## 2024-06-11 RX ADMIN — IOPAMIDOL 140 ML: 755 INJECTION, SOLUTION INTRAVENOUS at 11:21

## 2024-06-11 RX ADMIN — ROCURONIUM BROMIDE 20 MG: 10 INJECTION INTRAVENOUS at 10:14

## 2024-06-11 RX ADMIN — PROPOFOL 40 MCG/KG/MIN: 10 INJECTION, EMULSION INTRAVENOUS at 17:48

## 2024-06-11 RX ADMIN — PHENYLEPHRINE HYDROCHLORIDE 30 MCG/MIN: 10 INJECTION INTRAVENOUS at 22:08

## 2024-06-11 RX ADMIN — LORAZEPAM 1 MG: 2 INJECTION INTRAMUSCULAR; INTRAVENOUS at 09:41

## 2024-06-11 RX ADMIN — LORAZEPAM 2 MG: 2 INJECTION INTRAMUSCULAR at 10:00

## 2024-06-11 RX ADMIN — DEXTROSE 1 MG/MIN: 5 SOLUTION INTRAVENOUS at 15:30

## 2024-06-11 RX ADMIN — VANCOMYCIN HYDROCHLORIDE 1000 MG: 1 INJECTION, POWDER, LYOPHILIZED, FOR SOLUTION INTRAVENOUS at 14:48

## 2024-06-11 RX ADMIN — SODIUM CHLORIDE: 9 INJECTION, SOLUTION INTRAVENOUS at 22:59

## 2024-06-11 RX ADMIN — CEFEPIME 2000 MG: 2 INJECTION, POWDER, FOR SOLUTION INTRAVENOUS at 12:00

## 2024-06-11 RX ADMIN — SODIUM CHLORIDE: 9 INJECTION, SOLUTION INTRAVENOUS at 18:47

## 2024-06-11 RX ADMIN — HEPARIN SODIUM 4000 UNITS: 1000 INJECTION INTRAVENOUS; SUBCUTANEOUS at 14:56

## 2024-06-11 RX ADMIN — AMIODARONE HYDROCHLORIDE 150 MG: 1.5 INJECTION, SOLUTION INTRAVENOUS at 15:17

## 2024-06-11 RX ADMIN — SODIUM CHLORIDE 30 ML/KG/HR: 9 INJECTION, SOLUTION INTRAVENOUS at 13:28

## 2024-06-11 RX ADMIN — ROCURONIUM BROMIDE 100 MG: 10 INJECTION INTRAVENOUS at 10:14

## 2024-06-11 RX ADMIN — METRONIDAZOLE 500 MG: 500 INJECTION, SOLUTION INTRAVENOUS at 13:07

## 2024-06-11 RX ADMIN — LORAZEPAM 1 MG: 2 INJECTION INTRAMUSCULAR at 09:41

## 2024-06-11 RX ADMIN — ACETAMINOPHEN 325MG 650 MG: 325 TABLET ORAL at 21:02

## 2024-06-11 RX ADMIN — POTASSIUM PHOSPHATE, MONOBASIC POTASSIUM PHOSPHATE, DIBASIC 30 MMOL: 224; 236 INJECTION, SOLUTION, CONCENTRATE INTRAVENOUS at 20:22

## 2024-06-11 RX ADMIN — DILTIAZEM HYDROCHLORIDE 15 MG/HR: 5 INJECTION, SOLUTION INTRAVENOUS at 19:22

## 2024-06-11 RX ADMIN — HUMAN INSULIN 20 UNITS: 100 INJECTION, SOLUTION SUBCUTANEOUS at 14:48

## 2024-06-11 RX ADMIN — SODIUM CHLORIDE: 9 INJECTION, SOLUTION INTRAVENOUS at 17:22

## 2024-06-11 RX ADMIN — CEFEPIME 2000 MG: 2 INJECTION, POWDER, FOR SOLUTION INTRAVENOUS at 20:49

## 2024-06-11 RX ADMIN — POTASSIUM CHLORIDE 20 MEQ: 29.8 INJECTION, SOLUTION INTRAVENOUS at 22:59

## 2024-06-11 RX ADMIN — LEVETIRACETAM 2000 MG: 100 INJECTION INTRAVENOUS at 10:08

## 2024-06-11 RX ADMIN — VANCOMYCIN HYDROCHLORIDE 1000 MG: 1 INJECTION, POWDER, LYOPHILIZED, FOR SOLUTION INTRAVENOUS at 18:59

## 2024-06-11 RX ADMIN — PROPOFOL 40 MCG/KG/MIN: 10 INJECTION, EMULSION INTRAVENOUS at 22:27

## 2024-06-11 RX ADMIN — SODIUM CHLORIDE 19.1 UNITS/HR: 9 INJECTION, SOLUTION INTRAVENOUS at 18:43

## 2024-06-11 RX ADMIN — Medication 50 MCG/HR: at 16:31

## 2024-06-11 RX ADMIN — HEPARIN SODIUM 1000 UNITS/HR: 10000 INJECTION, SOLUTION INTRAVENOUS at 14:56

## 2024-06-11 RX ADMIN — SODIUM CHLORIDE 1535 ML: 9 INJECTION, SOLUTION INTRAVENOUS at 16:41

## 2024-06-11 RX ADMIN — SODIUM CHLORIDE 1000 ML: 9 INJECTION, SOLUTION INTRAVENOUS at 10:05

## 2024-06-11 ASSESSMENT — PULMONARY FUNCTION TESTS
PIF_VALUE: 26
PIF_VALUE: 28
PIF_VALUE: 26
PIF_VALUE: 25
PIF_VALUE: 24
PIF_VALUE: 14
PIF_VALUE: 29
PIF_VALUE: 23
PIF_VALUE: 26
PIF_VALUE: 24
PIF_VALUE: 21
PIF_VALUE: 26
PIF_VALUE: 27
PIF_VALUE: 25
PIF_VALUE: 26
PIF_VALUE: 9
PIF_VALUE: 26
PIF_VALUE: 23
PIF_VALUE: 23
PIF_VALUE: 26
PIF_VALUE: 25
PIF_VALUE: 26
PIF_VALUE: 24
PIF_VALUE: 23
PIF_VALUE: 26
PIF_VALUE: 25
PIF_VALUE: 26
PIF_VALUE: 19
PIF_VALUE: 22
PIF_VALUE: 29
PIF_VALUE: 23
PIF_VALUE: 26
PIF_VALUE: 25
PIF_VALUE: 27
PIF_VALUE: 23
PIF_VALUE: 27
PIF_VALUE: 28
PIF_VALUE: 26
PIF_VALUE: 25
PIF_VALUE: 23
PIF_VALUE: 24
PIF_VALUE: 27
PIF_VALUE: 14
PIF_VALUE: 20
PIF_VALUE: 25
PIF_VALUE: 24
PIF_VALUE: 24
PIF_VALUE: 26
PIF_VALUE: 24
PIF_VALUE: 26
PIF_VALUE: 14
PIF_VALUE: 23
PIF_VALUE: 23
PIF_VALUE: 24
PIF_VALUE: 27
PIF_VALUE: 24
PIF_VALUE: 23
PIF_VALUE: 33
PIF_VALUE: 27
PIF_VALUE: 24
PIF_VALUE: 27
PIF_VALUE: 24
PIF_VALUE: 24
PIF_VALUE: 23
PIF_VALUE: 26
PIF_VALUE: 26
PIF_VALUE: 14

## 2024-06-11 NOTE — ED NOTES
From home, decreased LOC x 1 week per family, fall 2 days ago and worsening since. Glucose \"high\" per EMS, seizure on arrival and enroute, 5 Versed per EMS. A Fib 160s.

## 2024-06-11 NOTE — ED NOTES
Per [facility] Galar[facility] patient to go to ICU prior to OR. Report given to J Kerley, RN ICU. Patient transported by this RN and RT to room 2116. Arianna Arndt updated on all.

## 2024-06-11 NOTE — PROCEDURES
Arterial Catheter Insertion Procedure Note    Consent: Unable to be obtained due to the emergent nature of this procedure.    Procedure: Insertion of Arterial Catheter    Indications:  Hypotension, Shock    Estimated Blood Loss: Minimal    Procedure Details   Informed consent was obtained for the procedure, including sedation.  Risks of hemorrhage, arrhythmia, infection and adverse drug reaction were discussed.   A time out was performed at 1615.  Ultrasound used and Right Radial artery was noted to be patent.  Collatoral flow was confirmed with an Matias's Test.  Under sterile conditions the skin above the Right Radial artery was prepped with Chloraprep and covered with a sterile drape after donning mask, sterile gown, annd sterile gloves.  A 22-gauge needle was inserted into the Right Radial artery.  A guide wire was then passed easily through the needle which was advanced with no resistance. Good flash of blood returned. The catheter was advanced over the existing wire without resistance or complication and subsequently sutured into place after pressure tubing connected and waveform verified on monitor.    Findings:  There were no complications nor changes to vital signs. Patient tolerated procedure well.    Total time of procedure: 15 minutes

## 2024-06-11 NOTE — ED NOTES
Per fijerome, patient had been feeling weak for 1.5 - 2 weeks. Poor PO intake, and mainly only fluids. Patient had fallen and potentially hit head 2 days ago, 0130 patient became altered/calling out. Had been incontinent around 0600 this AM and he called 911. Had not seized in over 3 years.

## 2024-06-11 NOTE — ED NOTES
Dr Salcedo at [facility] for central line.  to bedside and being updated by Dr Salcedo at this time.

## 2024-06-11 NOTE — ED PROVIDER NOTES
access for multiple drips and medications.  The procedure was completed under emergent conditions.  I did talk with the patient's fiancé regarding the procedure.  Risk and benefits were discussed.  He reports that the patient has designated him as her \"medical proxy\" risk and benefits were discussed including but not limited the possibility of bruising, bleeding, infection, nerve injury.  Informed consent was given.    Procedure was completed using maximum sterile technique including cap, mask, sterile gown, sterile gloves.  Right groin was sterilely prepped and draped with ChloraPrep.  Local anesthesia was given with 1% plain lidocaine subcutaneously, 5 mL.  Using a Cook needle the right femoral vein was identified on the first attempt with excellent venous return.  Using the Seldinger technique, triple-lumen catheter was inserted without difficulty on the first attempt.  There was excellent blood return at all 3 ports.  Ports were flushed with sterile saline.  Catheter was sutured in place using 2-0 silk suture.  Biopatch was applied.  Transparent sterile dressing was applied.  No bleeding.  No hematoma.        FINAL IMPRESSION      1. Seizure ([facility])    2. Acute left hemiparesis ([facility])    3. Altered mental status, unspecified altered mental status type    4. Hyperglycemia    5. Splenic infarct    6. 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 Atrial fibrillation with rapid ventricular response ([facility])    8. Acute respiratory failure, unspecified whether with hypoxia or hypercapnia ([facility])    9. Lung mass    10. Pneumonia of both lungs due to infectious organism, unspecified part of lung    11. Hyponatremia    12. Shortness of breath    13. Arterial occlusion, lower extremity ([facility])          DISPOSITION/PLAN   DISPOSITION Admitted 06/11/2024 03:05:07 PM      PATIENT REFERRED TO:  No follow-up provider specified.    DISCHARGE MEDICATIONS:  Current Discharge Medication List        Controlled Substances Monitoring:     RX Monitoring Periodic

## 2024-06-11 NOTE — ANESTHESIA PRE PROCEDURE
24 1045  Resp  Av.3  Min: 24   Min taken time: 24 1022  Max: 49   Max taken time: 24 1001  BP  Min: 91/74   Min taken time: 24 1415  Max: 167/99   Max taken time: 24 1045  MAP (mmHg)  Av.6  Min: 71   Min taken time: 24 1500  Max: 123   Max taken time: 24 1050  SpO2  Av %  Min: 84 %   Min taken time: 24 1140  Max: 100 %   Max taken time: 24 1600    BP Readings from Last 3 Encounters:   24 120/77   23 119/86   20 134/73     BMI  Body mass index is 33.31 kg/m².  Estimated body mass index is 33.31 kg/m² as calculated from the following:    Height as of 20: 1.753 m (5' 9\").    Weight as of this encounter: 102.3 kg (225 lb 8.5 oz).    CBC   Lab Results   Component Value Date/Time    WBC 21.1 2024 09:29 AM    RBC 4.15 2024 09:29 AM    HGB 10.7 2024 09:29 AM    HCT 34.6 2024 09:29 AM    MCV 83.4 2024 09:29 AM    RDW 17.9 2024 09:29 AM    PLT 94 2024 09:29 AM     CMP    Lab Results   Component Value Date/Time     2024 09:29 AM    K 3.8 2024 09:29 AM    CL 85 2024 09:29 AM    CO2 14 2024 09:29 AM    BUN 17 2024 09:29 AM    CREATININE 1.4 2024 09:29 AM    AGRATIO 0.8 2024 09:29 AM    LABGLOM 47 2024 09:29 AM    LABGLOM >60 2023 04:55 PM    GLUCOSE 788 2024 03:15 PM    CALCIUM 8.8 2024 09:29 AM    BILITOT 0.8 2024 09:29 AM    ALKPHOS 117 2024 09:29 AM    AST 17 2024 09:29 AM    ALT 15 2024 09:29 AM     BMP    Lab Results   Component Value Date/Time     2024 09:29 AM    K 3.8 2024 09:29 AM    CL 85 2024 09:29 AM    CO2 14 2024 09:29 AM    BUN 17 2024 09:29 AM    CREATININE 1.4 2024 09:29 AM    CALCIUM 8.8 2024 09:29 AM    LABGLOM 47 2024 09:29 AM    LABGLOM >60 2023 04:55 PM    GLUCOSE 788 2024 03:15 PM     POCGlucose  Recent Labs

## 2024-06-11 NOTE — H&P
6/11/2024  EXAMINATION: ONE XRAY VIEW OF THE CHEST 6/11/2024 10:31 am COMPARISON: None. HISTORY: ORDERING SYSTEM PROVIDED HISTORY: stroke symptoms TECHNOLOGIST PROVIDED HISTORY: Reason for exam:->stroke symptoms Reason for Exam: stroke symptoms FINDINGS: Endotracheal tube is appropriately positioned above the sahil.  Enteric tube tip is approximately 6 cm above the GE junction.  Recommend advancement of tube approximately 18 cm for improved positioning.  Heart, mediastinum normal.  Mild asymmetric perihilar airspace changes on the right.  The lungs are otherwise clear.  No significant skeletal finding.     1. Enteric tube tip is approximately 6 cm above the GE junction. Recommend advancement for improved positioning. 2. Endotracheal tube appears appropriately positioned. 3. Mild asymmetric perihilar airspace changes on the right.  Slight vascular congestion suspected.     CT HEAD WO CONTRAST    Result Date: 6/11/2024  EXAMINATION: CT OF THE HEAD WITHOUT CONTRAST  6/11/2024 9:49 am TECHNIQUE: CT of the head was performed without the administration of intravenous contrast. Automated exposure control, iterative reconstruction, and/or weight based adjustment of the mA/kV was utilized to reduce the radiation dose to as low as reasonably achievable. COMPARISON: None. HISTORY: ORDERING SYSTEM PROVIDED HISTORY: Stroke Symptoms TECHNOLOGIST PROVIDED HISTORY: Has a \"code stroke\" or \"stroke alert\" been called?->Yes Reason for exam:->Stroke Symptoms Decision Support Exception - unselect if not a suspected or confirmed emergency medical condition->Emergency Medical Condition (MA) Reason for Exam: ams, stroke symtpoms Initial evaluation. FINDINGS: Motion degrades images limiting evaluation. BRAIN/VENTRICLES: The gray-white differentiation appears grossly maintained within the limitations of extensive patient motion.  There is no convincing evidence of an acute intracranial hemorrhage.  No mass effect or midline shift is seen.

## 2024-06-11 NOTE — ED NOTES
Headband: applied  Date/Time: 1353 6/11/24   Recorder: recording started  Skin:  intact;applied  Highest Seizure [place] Percentage past hour: n/a      General info regarding Seizure [place] %:  Minimum duration of study is 2 hours. If Seizure [place] has remained 0% throughout the entire 2-hour duration, communicate with provider to stop the recording.     Seizure [place] 0-10% - Continue to monitor and complete 2-hour study.  Seizure [place] 11-89% - Epileptiform activity present. Notify provider for next steps.  Seizure [place] >/= 90% - Epileptiform activity consistent with Status Epilepticus. Immediately notify provider.     *Patients with Seizure [place] above 10% that persists may require a study longer than 2 hours. Maximum recording duration is 24 hours. Please update provider with a persistent increase in Seizure [place] above 10%.

## 2024-06-12 ENCOUNTER — APPOINTMENT (OUTPATIENT)
Dept: GENERAL RADIOLOGY | Age: 44
DRG: 720 | End: 2024-06-12
Payer: COMMERCIAL

## 2024-06-12 ENCOUNTER — APPOINTMENT (OUTPATIENT)
Dept: CT IMAGING | Age: 44
DRG: 720 | End: 2024-06-12
Attending: INTERNAL MEDICINE
Payer: COMMERCIAL

## 2024-06-12 ENCOUNTER — APPOINTMENT (OUTPATIENT)
Age: 44
DRG: 720 | End: 2024-06-12
Attending: HOSPITALIST
Payer: COMMERCIAL

## 2024-06-12 VITALS
WEIGHT: 242 LBS | OXYGEN SATURATION: 100 % | BODY MASS INDEX: 35.84 KG/M2 | DIASTOLIC BLOOD PRESSURE: 55 MMHG | HEIGHT: 69 IN | RESPIRATION RATE: 24 BRPM | SYSTOLIC BLOOD PRESSURE: 87 MMHG | TEMPERATURE: 100.3 F | HEART RATE: 110 BPM

## 2024-06-12 PROBLEM — R56.9 SEIZURE (HCC): Status: ACTIVE | Noted: 2024-06-12

## 2024-06-12 LAB
ABO + RH BLD: NORMAL
AMORPH SED URNS QL MICRO: ABNORMAL /HPF
ANION GAP SERPL CALCULATED.3IONS-SCNC: 10 MMOL/L (ref 3–16)
ANION GAP SERPL CALCULATED.3IONS-SCNC: 10 MMOL/L (ref 3–16)
ANION GAP SERPL CALCULATED.3IONS-SCNC: 9 MMOL/L (ref 3–16)
ANION GAP SERPL CALCULATED.3IONS-SCNC: 9 MMOL/L (ref 3–16)
APTT BLD: 24.9 SEC (ref 22.1–36.4)
BACTERIA URNS QL MICRO: ABNORMAL /HPF
BASE EXCESS BLDA CALC-SCNC: -13 MMOL/L (ref -3–3)
BILIRUB UR QL STRIP.AUTO: NEGATIVE
BLD GP AB SCN SERPL QL: NORMAL
BUN SERPL-MCNC: 14 MG/DL (ref 7–20)
BUN SERPL-MCNC: 15 MG/DL (ref 7–20)
BUN SERPL-MCNC: 15 MG/DL (ref 7–20)
BUN SERPL-MCNC: 16 MG/DL (ref 7–20)
CA-I BLD-SCNC: 1.15 MMOL/L (ref 1.12–1.32)
CALCIUM SERPL-MCNC: 6.9 MG/DL (ref 8.3–10.6)
CALCIUM SERPL-MCNC: 7 MG/DL (ref 8.3–10.6)
CALCIUM SERPL-MCNC: 7.1 MG/DL (ref 8.3–10.6)
CALCIUM SERPL-MCNC: 7.3 MG/DL (ref 8.3–10.6)
CHARACTER UR: ABNORMAL
CHLORIDE SERPL-SCNC: 111 MMOL/L (ref 99–110)
CHLORIDE SERPL-SCNC: 114 MMOL/L (ref 99–110)
CK SERPL-CCNC: 573 U/L (ref 26–192)
CK SERPL-CCNC: 676 U/L (ref 26–192)
CLARITY UR: ABNORMAL
CO2 BLDA-SCNC: 20 MMOL/L
CO2 SERPL-SCNC: 17 MMOL/L (ref 21–32)
CO2 SERPL-SCNC: 18 MMOL/L (ref 21–32)
COLOR UR: YELLOW
CREAT SERPL-MCNC: 1.4 MG/DL (ref 0.6–1.1)
CREAT SERPL-MCNC: 1.4 MG/DL (ref 0.6–1.1)
CREAT SERPL-MCNC: 1.5 MG/DL (ref 0.6–1.1)
CREAT SERPL-MCNC: 1.5 MG/DL (ref 0.6–1.1)
DEPRECATED RDW RBC AUTO: 17.7 % (ref 12.4–15.4)
DEPRECATED RDW RBC AUTO: 17.8 % (ref 12.4–15.4)
ECHO AO ROOT DIAM: 2.2 CM
ECHO AO ROOT INDEX: 0.98 CM/M2
ECHO AV PEAK GRADIENT: 8 MMHG
ECHO AV PEAK VELOCITY: 1.4 M/S
ECHO BSA: 2.31 M2
ECHO EST RA PRESSURE: 3 MMHG
ECHO LA AREA 4C: 14.1 CM2
ECHO LA DIAMETER INDEX: 1.83 CM/M2
ECHO LA DIAMETER: 4.1 CM
ECHO LA MAJOR AXIS: 4.7 CM
ECHO LA TO AORTIC ROOT RATIO: 1.86
ECHO LA VOL MOD A4C: 35 ML (ref 22–52)
ECHO LA VOLUME INDEX MOD A4C: 16 ML/M2 (ref 16–34)
ECHO LV FRACTIONAL SHORTENING: 17 % (ref 28–44)
ECHO LV INTERNAL DIMENSION DIASTOLE INDEX: 1.83 CM/M2
ECHO LV INTERNAL DIMENSION DIASTOLIC: 4.1 CM (ref 3.9–5.3)
ECHO LV INTERNAL DIMENSION SYSTOLIC INDEX: 1.52 CM/M2
ECHO LV INTERNAL DIMENSION SYSTOLIC: 3.4 CM
ECHO LV IVSD: 0.8 CM (ref 0.6–0.9)
ECHO LV MASS 2D: 105.6 G (ref 67–162)
ECHO LV MASS INDEX 2D: 47.1 G/M2 (ref 43–95)
ECHO LV POSTERIOR WALL DIASTOLIC: 0.9 CM (ref 0.6–0.9)
ECHO LV RELATIVE WALL THICKNESS RATIO: 0.44
ECHO MV MAX VELOCITY: 2.7 M/S
ECHO MV MEAN GRADIENT: 15 MMHG
ECHO MV MEAN VELOCITY: 1.8 M/S
ECHO MV PEAK GRADIENT: 29 MMHG
ECHO MV VTI: 48.9 CM
ECHO RA AREA 4C: 9.9 CM2
ECHO RA END SYSTOLIC VOLUME APICAL 4 CHAMBER INDEX BSA: 8 ML/M2
ECHO RA VOLUME: 18 ML
ECHO RIGHT VENTRICULAR SYSTOLIC PRESSURE (RVSP): 35 MMHG
ECHO RV BASAL DIMENSION: 3.1 CM
ECHO RV FREE WALL PEAK S': 10 CM/S
ECHO RV LONGITUDINAL DIMENSION: 6.1 CM
ECHO RV MID DIMENSION: 2.9 CM
ECHO RV TAPSE: 1.7 CM (ref 1.7–?)
ECHO TV REGURGITANT MAX VELOCITY: 2.82 M/S
ECHO TV REGURGITANT PEAK GRADIENT: 32 MMHG
EPI CELLS #/AREA URNS HPF: ABNORMAL /HPF (ref 0–5)
EST. AVERAGE GLUCOSE BLD GHB EST-MCNC: 251.8 MG/DL
EST. AVERAGE GLUCOSE BLD GHB EST-MCNC: 257.5 MG/DL
FINE GRAN CASTS #/AREA URNS HPF: ABNORMAL /LPF (ref 0–2)
GFR SERPLBLD CREATININE-BSD FMLA CKD-EPI: 43 ML/MIN/{1.73_M2}
GFR SERPLBLD CREATININE-BSD FMLA CKD-EPI: 43 ML/MIN/{1.73_M2}
GFR SERPLBLD CREATININE-BSD FMLA CKD-EPI: 47 ML/MIN/{1.73_M2}
GFR SERPLBLD CREATININE-BSD FMLA CKD-EPI: 47 ML/MIN/{1.73_M2}
GLUCOSE BLD-MCNC: 154 MG/DL (ref 70–99)
GLUCOSE BLD-MCNC: 173 MG/DL (ref 70–99)
GLUCOSE BLD-MCNC: 174 MG/DL (ref 70–99)
GLUCOSE BLD-MCNC: 178 MG/DL (ref 70–99)
GLUCOSE BLD-MCNC: 179 MG/DL (ref 70–99)
GLUCOSE BLD-MCNC: 182 MG/DL (ref 70–99)
GLUCOSE BLD-MCNC: 182 MG/DL (ref 70–99)
GLUCOSE BLD-MCNC: 186 MG/DL (ref 70–99)
GLUCOSE BLD-MCNC: 194 MG/DL (ref 70–99)
GLUCOSE BLD-MCNC: 197 MG/DL (ref 70–99)
GLUCOSE BLD-MCNC: 203 MG/DL (ref 70–99)
GLUCOSE BLD-MCNC: 221 MG/DL (ref 70–99)
GLUCOSE BLD-MCNC: 224 MG/DL (ref 70–99)
GLUCOSE BLD-MCNC: 234 MG/DL (ref 70–99)
GLUCOSE BLD-MCNC: 245 MG/DL (ref 70–99)
GLUCOSE BLD-MCNC: 251 MG/DL (ref 70–99)
GLUCOSE SERPL-MCNC: 167 MG/DL (ref 70–99)
GLUCOSE SERPL-MCNC: 177 MG/DL (ref 70–99)
GLUCOSE SERPL-MCNC: 183 MG/DL (ref 70–99)
GLUCOSE SERPL-MCNC: 251 MG/DL (ref 70–99)
GLUCOSE UR STRIP.AUTO-MCNC: NEGATIVE MG/DL
HBA1C MFR BLD: 10.4 %
HBA1C MFR BLD: 10.6 %
HCO3 BLDA-SCNC: 17.7 MMOL/L (ref 21–29)
HCT VFR BLD AUTO: 27.6 % (ref 36–48)
HCT VFR BLD AUTO: 31 % (ref 36–48)
HGB BLD CALC-MCNC: 10.4 GM/DL (ref 12–16)
HGB BLD-MCNC: 9 G/DL (ref 12–16)
HGB BLD-MCNC: 9.2 G/DL (ref 12–16)
HGB UR QL STRIP.AUTO: ABNORMAL
KETONES UR STRIP.AUTO-MCNC: NEGATIVE MG/DL
LACTATE BLD-SCNC: 1.06 MMOL/L (ref 0.4–2)
LACTATE BLDV-SCNC: 1.3 MMOL/L (ref 0.4–2)
LACTATE BLDV-SCNC: 1.5 MMOL/L (ref 0.4–2)
LACTATE BLDV-SCNC: 1.5 MMOL/L (ref 0.4–2)
LACTATE BLDV-SCNC: 1.8 MMOL/L (ref 0.4–2)
LACTATE BLDV-SCNC: 1.9 MMOL/L (ref 0.4–2)
LEUKOCYTE ESTERASE UR QL STRIP.AUTO: NEGATIVE
MAGNESIUM SERPL-MCNC: 2 MG/DL (ref 1.8–2.4)
MAGNESIUM SERPL-MCNC: 2 MG/DL (ref 1.8–2.4)
MAGNESIUM SERPL-MCNC: 2.1 MG/DL (ref 1.8–2.4)
MAGNESIUM SERPL-MCNC: 2.1 MG/DL (ref 1.8–2.4)
MCH RBC QN AUTO: 26 PG (ref 26–34)
MCH RBC QN AUTO: 26 PG (ref 26–34)
MCHC RBC AUTO-ENTMCNC: 32.7 G/DL (ref 31–36)
MCHC RBC AUTO-ENTMCNC: 33.2 G/DL (ref 31–36)
MCV RBC AUTO: 78.2 FL (ref 80–100)
MCV RBC AUTO: 79.4 FL (ref 80–100)
MRSA DNA SPEC QL NAA+PROBE: NORMAL
NITRITE UR QL STRIP.AUTO: NEGATIVE
PCO2 BLDA: 64.7 MM HG (ref 35–45)
PERFORMED ON: ABNORMAL
PH BLDA: 7.04 [PH] (ref 7.35–7.45)
PH UR STRIP.AUTO: 5.5 [PH] (ref 5–8)
PHOSPHATE SERPL-MCNC: 2.2 MG/DL (ref 2.5–4.9)
PHOSPHATE SERPL-MCNC: 3.3 MG/DL (ref 2.5–4.9)
PHOSPHATE SERPL-MCNC: 3.7 MG/DL (ref 2.5–4.9)
PHOSPHATE SERPL-MCNC: 4 MG/DL (ref 2.5–4.9)
PLATELET # BLD AUTO: 130 K/UL (ref 135–450)
PMV BLD AUTO: 9.7 FL (ref 5–10.5)
PMV BLD AUTO: 9.9 FL (ref 5–10.5)
PO2 BLDA: 148.1 MM HG (ref 75–108)
POC SAMPLE TYPE: ABNORMAL
POTASSIUM BLD-SCNC: 5.9 MMOL/L (ref 3.5–5.1)
POTASSIUM SERPL-SCNC: 4.9 MMOL/L (ref 3.5–5.1)
POTASSIUM SERPL-SCNC: 5 MMOL/L (ref 3.5–5.1)
POTASSIUM SERPL-SCNC: 5 MMOL/L (ref 3.5–5.1)
POTASSIUM SERPL-SCNC: 5.6 MMOL/L (ref 3.5–5.1)
PROT UR STRIP.AUTO-MCNC: 100 MG/DL
RBC # BLD AUTO: 3.48 M/UL (ref 4–5.2)
RBC # BLD AUTO: 3.54 M/UL (ref 4–5.2)
RBC #/AREA URNS HPF: ABNORMAL /HPF (ref 0–4)
REPORT: NORMAL
SAO2 % BLDA: 98 % (ref 93–100)
SODIUM BLD-SCNC: 143 MMOL/L (ref 136–145)
SODIUM SERPL-SCNC: 137 MMOL/L (ref 136–145)
SODIUM SERPL-SCNC: 138 MMOL/L (ref 136–145)
SODIUM SERPL-SCNC: 138 MMOL/L (ref 136–145)
SODIUM SERPL-SCNC: 141 MMOL/L (ref 136–145)
SP GR UR STRIP.AUTO: 1.03 (ref 1–1.03)
UA DIPSTICK W REFLEX MICRO PNL UR: YES
URN SPEC COLLECT METH UR: ABNORMAL
UROBILINOGEN UR STRIP-ACNC: 0.2 E.U./DL
VANCOMYCIN SERPL-MCNC: 14.6 UG/ML
WBC # BLD AUTO: 35.3 K/UL (ref 4–11)
WBC # BLD AUTO: 36.1 K/UL (ref 4–11)
WBC #/AREA URNS HPF: ABNORMAL /HPF (ref 0–5)

## 2024-06-12 PROCEDURE — 84295 ASSAY OF SERUM SODIUM: CPT

## 2024-06-12 PROCEDURE — 6360000002 HC RX W HCPCS: Performed by: STUDENT IN AN ORGANIZED HEALTH CARE EDUCATION/TRAINING PROGRAM

## 2024-06-12 PROCEDURE — 99292 CRITICAL CARE ADDL 30 MIN: CPT | Performed by: INTERNAL MEDICINE

## 2024-06-12 PROCEDURE — 80202 ASSAY OF VANCOMYCIN: CPT

## 2024-06-12 PROCEDURE — 70450 CT HEAD/BRAIN W/O DYE: CPT

## 2024-06-12 PROCEDURE — 6360000002 HC RX W HCPCS: Performed by: INTERNAL MEDICINE

## 2024-06-12 PROCEDURE — 94640 AIRWAY INHALATION TREATMENT: CPT

## 2024-06-12 PROCEDURE — 2580000003 HC RX 258: Performed by: EMERGENCY MEDICINE

## 2024-06-12 PROCEDURE — 6360000002 HC RX W HCPCS: Performed by: EMERGENCY MEDICINE

## 2024-06-12 PROCEDURE — 99291 CRITICAL CARE FIRST HOUR: CPT | Performed by: STUDENT IN AN ORGANIZED HEALTH CARE EDUCATION/TRAINING PROGRAM

## 2024-06-12 PROCEDURE — 84132 ASSAY OF SERUM POTASSIUM: CPT

## 2024-06-12 PROCEDURE — 95819 EEG AWAKE AND ASLEEP: CPT

## 2024-06-12 PROCEDURE — 81001 URINALYSIS AUTO W/SCOPE: CPT

## 2024-06-12 PROCEDURE — 2580000003 HC RX 258: Performed by: INTERNAL MEDICINE

## 2024-06-12 PROCEDURE — 99291 CRITICAL CARE FIRST HOUR: CPT | Performed by: INTERNAL MEDICINE

## 2024-06-12 PROCEDURE — 2500000003 HC RX 250 WO HCPCS: Performed by: NURSE PRACTITIONER

## 2024-06-12 PROCEDURE — 2700000000 HC OXYGEN THERAPY PER DAY

## 2024-06-12 PROCEDURE — 6360000002 HC RX W HCPCS: Performed by: HOSPITALIST

## 2024-06-12 PROCEDURE — 83735 ASSAY OF MAGNESIUM: CPT

## 2024-06-12 PROCEDURE — 82947 ASSAY GLUCOSE BLOOD QUANT: CPT

## 2024-06-12 PROCEDURE — 2500000003 HC RX 250 WO HCPCS: Performed by: INTERNAL MEDICINE

## 2024-06-12 PROCEDURE — 85014 HEMATOCRIT: CPT

## 2024-06-12 PROCEDURE — 6360000002 HC RX W HCPCS

## 2024-06-12 PROCEDURE — 85730 THROMBOPLASTIN TIME PARTIAL: CPT

## 2024-06-12 PROCEDURE — 94003 VENT MGMT INPAT SUBQ DAY: CPT

## 2024-06-12 PROCEDURE — 83605 ASSAY OF LACTIC ACID: CPT

## 2024-06-12 PROCEDURE — 93306 TTE W/DOPPLER COMPLETE: CPT

## 2024-06-12 PROCEDURE — 86850 RBC ANTIBODY SCREEN: CPT

## 2024-06-12 PROCEDURE — 80048 BASIC METABOLIC PNL TOTAL CA: CPT

## 2024-06-12 PROCEDURE — 86901 BLOOD TYPING SEROLOGIC RH(D): CPT

## 2024-06-12 PROCEDURE — 84100 ASSAY OF PHOSPHORUS: CPT

## 2024-06-12 PROCEDURE — 85027 COMPLETE CBC AUTOMATED: CPT

## 2024-06-12 PROCEDURE — 86900 BLOOD TYPING SEROLOGIC ABO: CPT

## 2024-06-12 PROCEDURE — 82803 BLOOD GASES ANY COMBINATION: CPT

## 2024-06-12 PROCEDURE — 82550 ASSAY OF CK (CPK): CPT

## 2024-06-12 PROCEDURE — 71045 X-RAY EXAM CHEST 1 VIEW: CPT

## 2024-06-12 PROCEDURE — 2500000003 HC RX 250 WO HCPCS: Performed by: EMERGENCY MEDICINE

## 2024-06-12 PROCEDURE — 6370000000 HC RX 637 (ALT 250 FOR IP): Performed by: HOSPITALIST

## 2024-06-12 PROCEDURE — 94761 N-INVAS EAR/PLS OXIMETRY MLT: CPT

## 2024-06-12 PROCEDURE — 2580000003 HC RX 258: Performed by: NURSE PRACTITIONER

## 2024-06-12 PROCEDURE — 2500000003 HC RX 250 WO HCPCS: Performed by: HOSPITALIST

## 2024-06-12 PROCEDURE — 2580000003 HC RX 258: Performed by: HOSPITALIST

## 2024-06-12 PROCEDURE — 99232 SBSQ HOSP IP/OBS MODERATE 35: CPT | Performed by: SURGERY

## 2024-06-12 PROCEDURE — 82330 ASSAY OF CALCIUM: CPT

## 2024-06-12 RX ORDER — PROPOFOL 10 MG/ML
5-50 INJECTION, EMULSION INTRAVENOUS CONTINUOUS
Status: DISCONTINUED | OUTPATIENT
Start: 2024-06-12 | End: 2024-06-12 | Stop reason: HOSPADM

## 2024-06-12 RX ORDER — HEPARIN SODIUM 1000 [USP'U]/ML
2000 INJECTION, SOLUTION INTRAVENOUS; SUBCUTANEOUS PRN
Status: DISCONTINUED | OUTPATIENT
Start: 2024-06-12 | End: 2024-06-12

## 2024-06-12 RX ORDER — HEPARIN SODIUM 1000 [USP'U]/ML
4000 INJECTION, SOLUTION INTRAVENOUS; SUBCUTANEOUS PRN
Status: DISCONTINUED | OUTPATIENT
Start: 2024-06-12 | End: 2024-06-12

## 2024-06-12 RX ORDER — CALCIUM GLUCONATE 20 MG/ML
1000 INJECTION, SOLUTION INTRAVENOUS ONCE
Status: COMPLETED | OUTPATIENT
Start: 2024-06-12 | End: 2024-06-12

## 2024-06-12 RX ORDER — MIDAZOLAM HYDROCHLORIDE 1 MG/ML
1-10 INJECTION, SOLUTION INTRAVENOUS CONTINUOUS
Status: DISCONTINUED | OUTPATIENT
Start: 2024-06-12 | End: 2024-06-12 | Stop reason: HOSPADM

## 2024-06-12 RX ORDER — HEPARIN SODIUM AND DEXTROSE 10000; 5 [USP'U]/100ML; G/100ML
0-4000 INJECTION INTRAVENOUS CONTINUOUS
Status: DISCONTINUED | OUTPATIENT
Start: 2024-06-12 | End: 2024-06-12 | Stop reason: HOSPADM

## 2024-06-12 RX ORDER — DIGOXIN 0.25 MG/ML
500 INJECTION INTRAMUSCULAR; INTRAVENOUS ONCE
Status: COMPLETED | OUTPATIENT
Start: 2024-06-12 | End: 2024-06-12

## 2024-06-12 RX ORDER — ROCURONIUM BROMIDE 10 MG/ML
100 INJECTION, SOLUTION INTRAVENOUS ONCE
Status: COMPLETED | OUTPATIENT
Start: 2024-06-12 | End: 2024-06-12

## 2024-06-12 RX ORDER — FUROSEMIDE 10 MG/ML
20 INJECTION INTRAMUSCULAR; INTRAVENOUS ONCE
Status: DISCONTINUED | OUTPATIENT
Start: 2024-06-12 | End: 2024-06-12 | Stop reason: HOSPADM

## 2024-06-12 RX ORDER — HEPARIN SODIUM 1000 [USP'U]/ML
4000 INJECTION, SOLUTION INTRAVENOUS; SUBCUTANEOUS ONCE
Status: COMPLETED | OUTPATIENT
Start: 2024-06-12 | End: 2024-06-12

## 2024-06-12 RX ORDER — FENTANYL CITRATE-0.9 % NACL/PF 10 MCG/ML
25-200 PLASTIC BAG, INJECTION (ML) INTRAVENOUS CONTINUOUS
Status: DISCONTINUED | OUTPATIENT
Start: 2024-06-12 | End: 2024-06-12 | Stop reason: HOSPADM

## 2024-06-12 RX ADMIN — POTASSIUM CHLORIDE 20 MEQ: 29.8 INJECTION, SOLUTION INTRAVENOUS at 02:24

## 2024-06-12 RX ADMIN — ROCURONIUM BROMIDE 100 MG: 10 INJECTION, SOLUTION INTRAVENOUS at 08:35

## 2024-06-12 RX ADMIN — DEXTROSE AND SODIUM CHLORIDE: 5; 450 INJECTION, SOLUTION INTRAVENOUS at 14:41

## 2024-06-12 RX ADMIN — DEXTROSE 0.5 MG/MIN: 5 SOLUTION INTRAVENOUS at 14:19

## 2024-06-12 RX ADMIN — SODIUM CHLORIDE 14.8 UNITS/HR: 9 INJECTION, SOLUTION INTRAVENOUS at 02:02

## 2024-06-12 RX ADMIN — MIDAZOLAM IN SODIUM CHLORIDE 2 MG/HR: 1 INJECTION INTRAVENOUS at 01:00

## 2024-06-12 RX ADMIN — DEXTROSE AND SODIUM CHLORIDE: 5; 450 INJECTION, SOLUTION INTRAVENOUS at 00:16

## 2024-06-12 RX ADMIN — ALBUTEROL SULFATE 2.5 MG: 2.5 SOLUTION RESPIRATORY (INHALATION) at 08:05

## 2024-06-12 RX ADMIN — VANCOMYCIN HYDROCHLORIDE 1500 MG: 1.5 INJECTION, POWDER, LYOPHILIZED, FOR SOLUTION INTRAVENOUS at 11:02

## 2024-06-12 RX ADMIN — CALCIUM GLUCONATE 1000 MG: 20 INJECTION, SOLUTION INTRAVENOUS at 11:06

## 2024-06-12 RX ADMIN — Medication 125 MCG/HR: at 09:24

## 2024-06-12 RX ADMIN — Medication 10 ML: at 11:31

## 2024-06-12 RX ADMIN — Medication 125 MCG/HR: at 01:59

## 2024-06-12 RX ADMIN — DIGOXIN 500 MCG: 0.25 INJECTION INTRAMUSCULAR; INTRAVENOUS at 10:41

## 2024-06-12 RX ADMIN — MIDAZOLAM IN SODIUM CHLORIDE 7 MG/HR: 1 INJECTION INTRAVENOUS at 14:16

## 2024-06-12 RX ADMIN — HEPARIN SODIUM AND DEXTROSE 1000 UNITS/HR: 10000; 5 INJECTION INTRAVENOUS at 11:37

## 2024-06-12 RX ADMIN — MEROPENEM 1000 MG: 1 INJECTION INTRAVENOUS at 00:08

## 2024-06-12 RX ADMIN — SODIUM CHLORIDE 14.8 UNITS/HR: 9 INJECTION, SOLUTION INTRAVENOUS at 07:37

## 2024-06-12 RX ADMIN — HEPARIN SODIUM 4000 UNITS: 1000 INJECTION INTRAVENOUS; SUBCUTANEOUS at 11:37

## 2024-06-12 RX ADMIN — ACETAMINOPHEN 325MG 650 MG: 325 TABLET ORAL at 03:05

## 2024-06-12 RX ADMIN — POTASSIUM CHLORIDE 20 MEQ: 29.8 INJECTION, SOLUTION INTRAVENOUS at 01:22

## 2024-06-12 RX ADMIN — DILTIAZEM HYDROCHLORIDE 15 MG/HR: 5 INJECTION, SOLUTION INTRAVENOUS at 10:17

## 2024-06-12 RX ADMIN — Medication 20 MG: at 10:38

## 2024-06-12 RX ADMIN — POTASSIUM CHLORIDE 20 MEQ: 29.8 INJECTION, SOLUTION INTRAVENOUS at 11:04

## 2024-06-12 RX ADMIN — DILTIAZEM HYDROCHLORIDE 15 MG/HR: 5 INJECTION, SOLUTION INTRAVENOUS at 02:12

## 2024-06-12 RX ADMIN — POTASSIUM PHOSPHATE, MONOBASIC POTASSIUM PHOSPHATE, DIBASIC 30 MMOL: 224; 236 INJECTION, SOLUTION, CONCENTRATE INTRAVENOUS at 02:33

## 2024-06-12 RX ADMIN — LEVETIRACETAM 500 MG: 100 INJECTION, SOLUTION INTRAVENOUS at 10:38

## 2024-06-12 RX ADMIN — DEXTROSE AND SODIUM CHLORIDE: 5; 450 INJECTION, SOLUTION INTRAVENOUS at 07:37

## 2024-06-12 RX ADMIN — SODIUM CHLORIDE 17.4 UNITS/HR: 9 INJECTION, SOLUTION INTRAVENOUS at 15:03

## 2024-06-12 RX ADMIN — POTASSIUM CHLORIDE 20 MEQ: 29.8 INJECTION, SOLUTION INTRAVENOUS at 00:09

## 2024-06-12 ASSESSMENT — PULMONARY FUNCTION TESTS
PIF_VALUE: 19
PIF_VALUE: 32
PIF_VALUE: 20
PIF_VALUE: 20
PIF_VALUE: 21
PIF_VALUE: 21
PIF_VALUE: 22
PIF_VALUE: 20
PIF_VALUE: 19
PIF_VALUE: 31
PIF_VALUE: 34
PIF_VALUE: 22
PIF_VALUE: 34
PIF_VALUE: 29
PIF_VALUE: 32
PIF_VALUE: 23
PIF_VALUE: 22
PIF_VALUE: 21
PIF_VALUE: 22
PIF_VALUE: 16
PIF_VALUE: 22
PIF_VALUE: 33
PIF_VALUE: 20
PIF_VALUE: 30
PIF_VALUE: 18
PIF_VALUE: 34
PIF_VALUE: 33
PIF_VALUE: 34
PIF_VALUE: 33
PIF_VALUE: 20
PIF_VALUE: 21
PIF_VALUE: 28
PIF_VALUE: 19
PIF_VALUE: 22
PIF_VALUE: 19
PIF_VALUE: 35
PIF_VALUE: 18
PIF_VALUE: 33
PIF_VALUE: 21
PIF_VALUE: 20
PIF_VALUE: 21
PIF_VALUE: 34
PIF_VALUE: 21
PIF_VALUE: 18
PIF_VALUE: 18
PIF_VALUE: 22
PIF_VALUE: 21
PIF_VALUE: 20
PIF_VALUE: 21
PIF_VALUE: 32
PIF_VALUE: 19
PIF_VALUE: 33
PIF_VALUE: 21
PIF_VALUE: 33
PIF_VALUE: 23
PIF_VALUE: 19
PIF_VALUE: 21
PIF_VALUE: 24
PIF_VALUE: 25
PIF_VALUE: 36
PIF_VALUE: 28
PIF_VALUE: 20
PIF_VALUE: 33
PIF_VALUE: 20
PIF_VALUE: 22
PIF_VALUE: 22
PIF_VALUE: 23
PIF_VALUE: 25
PIF_VALUE: 33
PIF_VALUE: 20
PIF_VALUE: 17
PIF_VALUE: 19
PIF_VALUE: 24
PIF_VALUE: 34
PIF_VALUE: 20
PIF_VALUE: 20
PIF_VALUE: 32
PIF_VALUE: 25
PIF_VALUE: 22
PIF_VALUE: 22
PIF_VALUE: 21
PIF_VALUE: 35
PIF_VALUE: 20
PIF_VALUE: 31
PIF_VALUE: 24
PIF_VALUE: 21
PIF_VALUE: 22
PIF_VALUE: 33
PIF_VALUE: 19
PIF_VALUE: 20
PIF_VALUE: 26
PIF_VALUE: 20
PIF_VALUE: 24
PIF_VALUE: 18
PIF_VALUE: 24
PIF_VALUE: 33
PIF_VALUE: 24
PIF_VALUE: 17
PIF_VALUE: 33
PIF_VALUE: 9
PIF_VALUE: 18
PIF_VALUE: 20
PIF_VALUE: 33
PIF_VALUE: 21
PIF_VALUE: 34
PIF_VALUE: 33
PIF_VALUE: 18
PIF_VALUE: 27
PIF_VALUE: 20
PIF_VALUE: 32
PIF_VALUE: 19
PIF_VALUE: 20
PIF_VALUE: 19
PIF_VALUE: 33
PIF_VALUE: 20
PIF_VALUE: 21
PIF_VALUE: 19
PIF_VALUE: 17
PIF_VALUE: 34
PIF_VALUE: 33
PIF_VALUE: 21

## 2024-06-12 NOTE — CARE COORDINATION
Plan for patient to transfer to  for further care. Accepting physician in place. Evidently  is full and they are in the process of arranging an ICU bed.     Victor M STORY RN  Case Management  [phone]    Electronically signed by Victor M Jimenez RN on 6/12/2024 at 2:36 PM    Addendum:     Transferring to  by  Air.     Victor M STORY RN  Case Management  [phone]    Electronically signed by Victor M Jimenez RN on 6/12/2024 at 3:22 PM

## 2024-06-12 NOTE — CONSULTS
[facility]          [address] [place] [address]                              CONSULTATION      PATIENT NAME: REGINE PELAYO               : 1980  MED REC NO: [id number]                      ROOM: [place] [address]  ACCOUNT NO: [id number]                       ADMIT DATE: 2024  PROVIDER: Yung Charles MD      REASON FOR CONSULTATION:  Acute kidney injury with low urine output.    HISTORY OF PRESENTING ILLNESS:  A 44-year-old  female with past medical history significant for asthma, seizure disorder?, brought to the emergency room with change of mentation.  The patient required intubation.  Blood culture came back positive for Staph aureus.  The patient had an echocardiogram done that showed left atrial appendage thrombus.  Also found to be in Afib with rapid ventricular response with possible iliac thrombus and loss of distal pulses on the left lower extremity.  Renal consultation has been called for acute kidney injury as well as low urine output.  At the time of consultation, the patient is intubated and sedated.  The patient was also diagnosed with diabetic ketoacidosis and has been requiring aggressive hydration.  At this point, the patient is a scheduled transfer to  of concerning left atrial appendage thrombus.  The patient remained critically ill.  She is also scheduled to go to OR for a possible thrombectomy of the left lower extremity.  Most of the information obtained from the records as well as discussing with the nurse.    PAST MEDICAL HISTORY:    1. Asthma.   2. Hypertension.  3. Obesity.  4. Seizure disorder?    OUTPATIENT MEDICATIONS:  Include Keppra?    INPATIENT MEDICATIONS:  Include Cordarone, Lanoxin, vancomycin.    SOCIAL HISTORY:  Unobtainable.    FAMILY HISTORY:  Unobtainable.    REVIEW OF SYSTEMS:  Unobtainable.    PHYSICAL EXAMINATION:  GENERAL:  She is lying in bed, intubated, and sedated.  VITALS:  Blood pressure 95/56, 
Clinical Pharmacy Note  Vancomycin Consult    Yenni Gagnon is a 44 y.o. female ordered vancomycin for sepsis of unknown origin; consult received from Dr. Lamas to manage therapy. Also receiving cefepime    Allergies:  Aspirin and Penicillins     Temp max:  Temp (24hrs), Av.6 °F (38.7 °C), Min:98.7 °F (37.1 °C), Max:103.3 °F (39.6 °C)      Recent Labs     24  0929   WBC 21.1*       Recent Labs     24  0929   BUN 17   CREATININE 1.4*         Intake/Output Summary (Last 24 hours) at 2024 1829  Last data filed at 2024 1804  Gross per 24 hour   Intake 1503.27 ml   Output 1530 ml   Net -26.73 ml       Culture Results:  MRSA probe ordered    Ht Readings from Last 1 Encounters:   24 1.753 m (5' 9\")        Wt Readings from Last 1 Encounters:   24 102.3 kg (225 lb 8.5 oz)         Estimated Creatinine Clearance: 65 mL/min (A) (based on SCr of 1.4 mg/dL (H)).    Assessment/Plan:  Day # 1 of vancomycin.  Vancomycin 2000 mg IV as 2 doses (one given in ER, ordered additional 1000 mg once patient transferred to floor)  Goal trough 15-20  Will dose intermittently due to YAEL on admission & DKA  Will order random level AM for tomorrow AM     Thank you for the consult.   Myra Wright, EllieD, BCPS  2024 6:33 PM  
[facility]  Consultation Note    6/11/2024 3:25 PM    Chief Complaint: Seizures     Reason for Consultation: Cold Left Leg    History of Present Illness:  Patient is a 44 y.o. female who presented to the ED on 6/11/2024 with complaints of Seizure. She has a significant PMH of Seizure, Asthma and HTN. The patient apparently has been feeling ill for the past 7-10 days with abdominal pain, nausea and vomiting. She reportedly had a fall over the past few days. She was noted to have seizure activity at home and enroute with EMS. Once in the ED, she was not able to follow commands and was noted to have Left Side weakness. CT of her Head/Neck was done which was negative. CTA of her Abdomen/Pelvis was performed to evaluate for her GI symptoms prior to admission. This was notable for a Left Iliac Occlusion. She was noted to be in A-fib RVR in the ED. She was intubated while in the ED and her Left Leg reportedly became worse after the CTA was completed. We were consulted to evaluate the patient for a Cold Left Leg. At present, the patient is seen resting in bed in the ED, she is intubated and sedated and unable to participate in exam.    Review of Systems  Review of Systems   Unable to perform ROS: Intubated        Past Medical History:   Diagnosis Date    Asthma     Hypertension        No past surgical history on file.    Allergies   Allergen Reactions    Aspirin Itching    Penicillins        Social History     Socioeconomic History    Marital status: Single     Spouse name: Not on file    Number of children: Not on file    Years of education: Not on file    Highest education level: Not on file   Occupational History    Not on file   Tobacco Use    Smoking status: Every Day     Current packs/day: 0.50     Types: Cigarettes    Smokeless tobacco: Never   Vaping Use    Vaping Use: Never used   Substance and Sexual Activity    Alcohol use: Not Currently    Drug use: Not Currently    Sexual activity: 
REASON FOR CONSULTATION/CC: Respiratory failure      Consult at request of Dio Lamas MD     PCP: No primary care provider on file.  Established Pulmonologist:  None    Chief Complaint   Patient presents with    Altered Mental Status    Seizures     From home, decreased LOC x 1 week per family, fall 2 days ago and worsening since. Glucose \"high\" per EMS, seizure on arrival and enroute, 5 Versed per EMS. A Fib 160s.       HISTORY OF PRESENT ILLNESS: Yenni Gagnon is a 44 y.o. year old female with a history of asthma who presents with seizure-like activity and altered mental status.  During my visit patient is intubated and sedated on provide HPI is obtained from bedside nurse report and EMR.  Patient also found to have 2/2 blood cultures + staph.  She started on empiric broad-spectrum antibiotics.  CT of the head initially was equivocal for intracranial hemorrhage but subsequently seems to be less likely.  Neurosurgery is following overnight.  Patient echocardiogram preliminarily with possible vegetation/thrombus.  Also found to have concern for iliac thrombus with loss of distal pulses.  Vascular surgery consulted      Past Medical History:   Diagnosis Date    Asthma     Hypertension          History reviewed. No pertinent surgical history.    Family Hx  family history is not on file.    Social Hx   reports that she has been smoking cigarettes. She has never used smokeless tobacco.    Scheduled Meds:   calcium gluconate-NaCl  1,000 mg IntraVENous Once    rocuronium  100 mg IntraVENous Once    albuterol  2.5 mg Nebulization 4x Daily RT    sodium chloride flush  5-40 mL IntraVENous 2 times per day    levETIRAcetam  500 mg IntraVENous Q12H    vancomycin (VANCOCIN) intermittent dosing (placeholder)   Other RX Placeholder    potassium phosphate IVPB (CENTRAL LINE)  30 mmol IntraVENous Q6H       Continuous Infusions:   midazolam 7 mg/hr (06/12/24 0507)    dilTIAZem 15 mg/hr (06/12/24 0507)    propofol Stopped 
See consult note from earlier today.    Ora Santillan MD   
(L)   MPV 5.0 - 10.5 fL 11.1 (H)   RDW 12.4 - 15.4 % 17.9 (H)   Platelet Count 135 - 450 K/uL 94 (L)   (H): Data is abnormally high  (L): Data is abnormally low    CT H WO 6-  IMPRESSION:  No convincing evidence of an acute intracranial abnormality within the  limitations of extensive patient motion.    CTA H and N 6-  IMPRESSION:  1. Unremarkable CTA of the head and neck.  2. Right middle lobe mass measuring 1.4 x 0.9 cm. Left upper lobe infiltrate  is somewhat masslike measuring 7 mm but could represent round atelectasis.    Impression:  Seizures  History of seizure  Altered mental status  Reported left sided weakness  Acute left LE ischemia possible 2/2 emboli  Diabetic ketoacidosis  Afib with RVR  Kidney and splenic infarct.    Patient had seizures prior to arrival. She has a reported history of seizures years ago, but no recent seizures and not on AED.  Would consider these seizures provoked in the setting of multiple medical issues.  For now will continue AED with treating medical issues.  With her medical issues, will likely be discharged with AED and outpatient consideration for weaning can take place.    Patient had reported left sided weakness.  When seen by me she did not have any movement in extremities.  Can not rule out stroke with infarcts in other places in body and afib with RVR, but could also be Mario's paralysis (due to seizure).  With plan for surgery that would require significant heparin and being on heparin at this time, will recommend repeat CT H as it has been at least 6 hours since first one.  At this point, if there is a large stroke, it would be seen on CT H.     Altered mental status is likely multifactorial with seizures and multiple medical issues.     Recommendations:  - MRI brain W/WO when able to tolerate (will not order at this time as not likely able to tolerate at this time)  - rEEG  - keppra  mg BID    - CT H WO repeat- if no large stroke seen on CT H, no 
severe mitral stenosis  Acute hypoxic respiratory failure    Patient who presented with multisystem embolic disease, presumed to be from a MEENA thrombus from her new onset atrial fibrillation.  ECHO performed today with a large thrombus/mass associated with the mitral valve causing severe stenotic physiology though at admittedly suboptimal loading conditions with her profound tachycardia.      On her presentation from her embolic disease she had a reported brain bleed which sounds like it has resolved at this time.  She would be a challenging candidate for valvular intervention but I think she requires a facility that has both neurosurgical and cardiothoracic capabilities.  Unfortunately within our system there is not an optimal option for that    Discussed with primary physician - I would recommend transfer to [facility] for further workup/intervention as appropriate.       ---    I will address the patient's cardiac risk factors and adjusted pharmacologic treatment as needed. In addition, I have reinforced the need for patient directed risk factor modification.    Tobacco use was discussed with the patient and educated on the negative effects. I have asked the patient to not utilize these agents.    Thank you for allowing to us to participate in the care or Yenni Gagnon. Further evaluation will be based upon the patient's clinical course and testing results.    All questions and concerns were addressed to the patient/family. Alternatives to my treatment were discussed. The note was completed using EMR. Every effort was made to ensure accuracy; however, inadvertent computerized transcription errors may be present.    Donnie Regan MD  Interventional Cardiology  6/12/2024  10:37 AM    [facility]  [address], [address]   [place] [address]  Ph: [phone]  Fax: [phone]    NOTE: This report was transcribed using voice recognition software. Every effort was made to ensure

## 2024-06-12 NOTE — PLAN OF CARE
Problem: Discharge Planning  Goal: Discharge to home or other facility with appropriate resources  Outcome: Progressing  Flowsheets (Taken 6/11/2024 1940)  Discharge to home or other facility with appropriate resources:   Arrange for needed discharge resources and transportation as appropriate   Identify barriers to discharge with patient and caregiver   Identify discharge learning needs (meds, wound care, etc)   Refer to discharge planning if patient needs post-hospital services based on physician order or complex needs related to functional status, cognitive ability or social support system     Problem: Safety - Medical Restraint  Goal: Remains free of injury from restraints (Restraint for Interference with Medical Device)  Description: INTERVENTIONS:  1. Determine that other, less restrictive measures have been tried or would not be effective before applying the restraint  2. Evaluate the patient's condition at the time of restraint application  3. Inform patient/family regarding the reason for restraint  4. Q2H: Monitor safety, psychosocial status, comfort, nutrition and hydration  Outcome: Progressing  Flowsheets  Taken 6/11/2024 2000 by Sourav Sanches RN  Remains free of injury from restraints (restraint for interference with medical device):   Determine that other, less restrictive measures have been tried or would not be effective before applying the restraint   Evaluate the patient's condition at the time of restraint application   Every 2 hours: Monitor safety, psychosocial status, comfort, nutrition and hydration   Inform patient/family regarding the reason for restraint  Taken 6/11/2024 1552 by Kerley, [place] L, RN  Remains free of injury from restraints (restraint for interference with medical device):   Determine that other, less restrictive measures have been tried or would not be effective before applying the restraint   Evaluate the patient's condition at the time of restraint application

## 2024-06-12 NOTE — PLAN OF CARE
4h repeat head CT looks stable, but inconclusive for ICH. Recommend continue to hold heparin and maintain normotension. Repeat head CT in am. Scan inconlusive in part because of prior contrast adim which should flush out by morning. Scan still degraded by motion artifact and difficult to interpret subtle findings - suggest neuromuscular paralysis and head restraint for next head CT.

## 2024-06-12 NOTE — PROCEDURES
[facility]          [address] [place] [address]                       ELECTROENCEPHALOGRAM REPORT      PATIENT NAME: REGINE PELAYO               : 1980  MED REC NO: [id number]                      ROOM: [place] [address]  ACCOUNT NO: [id number]                       ADMIT DATE: 2024  PROVIDER: Mario Wong DO      DATE OF EE2024    REFERRING PHYSICIAN:  Ora Santillan MD    PROCEDURE PERFORMED:  Electroencephalogram.    REASON FOR STUDY:  Seizures.    BRIEF HISTORY AND NEUROLOGIC FINDINGS:  The patient is a 44-year-old female, being evaluated for reported seizures.    MEDICATIONS:  The patient's centrally acting medications listed include Ativan, Versed, propofol, fentanyl, and Keppra.    EEG FINDINGS:  A 20-channel digital EEG performed utilizing bipolar and referential montages.  Wakefulness was not obtained during the recording.  The patient appeared to be asleep throughout much of the recording, though there was also evidence of what appeared to be a somewhat burst suppression pattern with intermixed sharply contoured theta frequencies at times during the recording.  This was slightly more prominent on the left as compared to the right.  There was no obvious periodicity and many of these sharp waves were somewhat triphasic in morphology with a maximum in the left frontal region.  Occasional poorly-formed K complexes were noted during the recording.    Photic stimulation was performed at various flash frequencies and did not evoke a significant posterior driving response.  Hyperventilation exercise was not performed due to the patient's clinical history.    A 1-channel EKG rhythm strip was reviewed and showed tachycardia with heart rates around 120 beats per minute throughout the entire recording.    EEG DIAGNOSIS:  Abnormal sleep EEG secondary to occasional burst suppression pattern along with occasional sharp waves, which appeared to

## 2024-06-12 NOTE — PROGRESS NOTES
Ct head result noted  Discussed with RN , heparin drip stopped , awaiting further recommendation from neurosurgery  
    Discussed with Dr Ledezma , intensivist at   Patient accepted there  
    Discussed with cardiologist  Based on ECHO finding of Mitral valve vegetation , transfer to  initiated    
    Perfectserve message from RN , per neurosurgery Dr Gonzalez , repeat CT head at 9 pm tonight , if CT head finding are worsening from previous study then transfer to [facility]  
    Plan of care discussed with family    Discussed in detail with night hospitalist to communicate repeat CT head report with neurosurgeon and vascular surgeon and manage and formulate further medical plan accordingly   
  Transfer center called for evaluation by neurosurgery   Awaiting neurosurgery recommendation  
4 Eyes Skin Assessment     NAME:  Yenni Gagnon  YOB: 1980  MEDICAL RECORD NUMBER:  [id number]    The patient is being assessed for  Admission    I agree that at least one RN has performed a thorough Head to Toe Skin Assessment on the patient. ALL assessment sites listed below have been assessed.      Areas assessed by both nurses:    Head, Face, Ears, Shoulders, Back, Chest, Arms, Elbows, Hands, Sacrum. Buttock, Coccyx, Ischium, Legs. Feet and Heels, and Under Medical Devices         Does the Patient have a Wound? No noted wound(s)       Kieran Prevention initiated by RN: Yes  Wound Care Orders initiated by RN: No    Pressure Injury (Stage 3,4, Unstageable, DTI, NWPT, and Complex wounds) if present, place Wound referral order by RN under : No    New Ostomies, if present place, Ostomy referral order under : No     Nurse 1 eSignature: Electronically signed by Annette Holliday RN on 6/11/24 at 5:34 PM EDT    **SHARE this note so that the co-signing nurse can place an eSignature**    Nurse 2 eSignature: Electronically signed by Jessica L Kerley, RN on 6/11/24 at 7:48 PM EDT   
Arianna Vincent) and  updated on pt's departure.    Electronically signed by Jessica L Kerley, RN on 6/12/2024 at 4:52 PM    
Arianna called this RN. RN updated Hair, Hair Transferred to Arthur PHAN for more information.   
Arthur PHAN at nurses station. This RN informed MD about positive blood cultures and a temperature that is remaining above 101.    New orders for repeat cultures and cooling blanket.   
Assisted with intubation. Pt tolerated well. Will continue to monitor and make changes as needed.  DEMETRIA BINGHAM RCP  
Attempted to call patient's fiance, did not . Will try again later.  
Attempted to call vascular surgery again. Message left with answering service. Awaiting call back.  
Bed ready at . Transport being set up.    Electronically signed by Jessica L Kerley, RN on 6/12/2024 at 3:08 PM    
Blood cultures growing Staphylococcus lugdunensis     Will order repeat blood cultures for 11 am.  
CT and back with no issues on transport vent with BMV in pt bed.  
Call out to neurology regarding CT head results.    Electronically signed by Jessica L Kerley, RN on 6/11/2024 at 5:32 PM    
Clinical Pharmacy Note  Heparin Dosing Consult    Yenni Gagnon is a 44 y.o. female ordered heparin per CAD/STEMI/NSTEMI/UA/AFIB nomogram by Dr. Lamas.     No results found for: \"ANTIXAUHEP\", \"LABHEPA\"   Lab Results   Component Value Date/Time    HGB 9.0 06/12/2024 11:30 AM    HCT 27.6 06/12/2024 11:30 AM     06/12/2024 11:30 AM    INR 1.27 06/11/2024 09:29 AM       Ht Readings from Last 1 Encounters:   06/12/24 1.753 m (5' 9\")        Wt Readings from Last 1 Encounters:   06/12/24 109.8 kg (242 lb)        Assessment/Plan:  Initial bolus: 4000 units  Initial infusion rate: 1000 units/hr  Next anti-Xa:: 6/12/24 1800    Pharmacy will continue to monitor adjust heparin based on anti-Xa results using nomogram below:     CAD/STEMI/NSTEMI/UA/AFIB Heparin Nomogram     Initial Bolus: 60 units/kg Max Bolus: 4,000 units       Initial Rate: 12 units/kg/hr Max Initial Rate: 1,000 units/hr     anti-Xa Bolus Titration   < 0.1 Heparin 60 units/kg bolus Increase drip by 4 units/kg/hr   0.1 - 0.29 Heparin 30 units/kg bolus Increase drip by 2 units/kg/hr   0.3 - 0.7 No Bolus No Change   0.71 - 0.8 No Bolus Decrease drip by 1 units/kg/hr   0.81 - 0.99 No Bolus Decrease drip by 2 units/kg/hr   > 1 Hold Heparin for 1 hour Decrease drip by 3 units/kg/hr     Obtain anti-Xa 6 hours after initial bolus and 6 hours after any dose change until two consecutive therapeutic anti-Xa levels are achieved - then daily.  Dixie Dubon, [facility],6/12/2024,3:12 PM      
Clinical Pharmacy Note  Heparin Dosing Consult    Yenni Gagnon is a 44 y.o. female ordered heparin per CAD/STEMI/NSTEMI/UA/AFIB nomogram by Dr. Salcedo.     No results found for: \"ANTIXAUHEP\", \"LABHEPA\"   Lab Results   Component Value Date/Time    HGB 10.7 06/11/2024 09:29 AM    HCT 34.6 06/11/2024 09:29 AM    PLT 94 06/11/2024 09:29 AM    INR 1.27 06/11/2024 09:29 AM       Ht Readings from Last 1 Encounters:   06/21/20 1.753 m (5' 9\")        Wt Readings from Last 1 Encounters:   06/11/24 102.3 kg (225 lb 8.5 oz)        Assessment/Plan:  Initial bolus: 4000 units  Initial infusion rate: 1000 units/hr  Next anti-Xa:: 2100 6/11/24    Pharmacy will continue to monitor adjust heparin based on anti-Xa results using nomogram below:     CAD/STEMI/NSTEMI/UA/AFIB Heparin Nomogram     Initial Bolus: 60 units/kg Max Bolus: 4,000 units       Initial Rate: 12 units/kg/hr Max Initial Rate: 1,000 units/hr     anti-Xa Bolus Titration   < 0.1 Heparin 60 units/kg bolus Increase drip by 4 units/kg/hr   0.1 - 0.29 Heparin 30 units/kg bolus Increase drip by 2 units/kg/hr   0.3 - 0.7 No Bolus No Change   0.71 - 0.8 No Bolus Decrease drip by 1 units/kg/hr   0.81 - 0.99 No Bolus Decrease drip by 2 units/kg/hr   > 1 Hold Heparin for 1 hour Decrease drip by 3 units/kg/hr     Obtain anti-Xa 6 hours after initial bolus and 6 hours after any dose change until two consecutive therapeutic anti-Xa levels are achieved - then daily.   
Clinical Pharmacy Note  Vancomycin Consult    Yenni Gagnon is a 44 y.o. female ordered vancomycin for sepsis; consult received from Dr. Lamas to manage therapy.     Allergies:  Aspirin and Penicillins     Temp max:  Temp (24hrs), Av.7 °F (38.7 °C), Min:100.3 °F (37.9 °C), Max:103.3 °F (39.6 °C)      Recent Labs     24  0929 24  0828   WBC 21.1* 35.3*       Recent Labs     24  0210 24  0605 24  0956   BUN 16 15 14   CREATININE 1.4* 1.5* 1.4*         Intake/Output Summary (Last 24 hours) at 2024 1134  Last data filed at 2024 0926  Gross per 24 hour   Intake 7212.83 ml   Output 2105 ml   Net 5107.83 ml       Culture Results:   Blood: S. lugdunensis    Ht Readings from Last 1 Encounters:   24 1.753 m (5' 9\")        Wt Readings from Last 1 Encounters:   24 109.8 kg (242 lb)         Estimated Creatinine Clearance: 67 mL/min (A) (based on SCr of 1.4 mg/dL (H)).    Assessment:  Day # 2 of vancomycin.  Current regimen: Intermittent dosing based on levels  Random level: 14.6 mg/L    Plan:  Vancomycin 1500 mg IV x 1 today  Random vancomycin level tomorrow in AM.    Thank you for the consult.     Summer Schwab, RPH, PharmD, BCCCP 2024 11:36 AM     
Dio Lamas MD paged to call luz Vincent) with update on condition.     Electronically signed by Jessica L Kerley, RN on 6/11/2024 at 8:12 PM    
Dr. Pereyra is speaking with her fiance (Hair) about procedure.    Electronically signed by Jessica L Kerley, RN on 6/12/2024 at 2:58 PM    
EEG completed and available for interpretation on the [facility] database .     
Heparin gtt stopped per Adrian Lamas MD    Electronically signed by Jessica L Kerley, RN on 6/11/2024 at 5:57 PM    
Increased VT to 350 per Dr. Salcedo due to high CO2. Will continue to monitor and make changes as needed.  DEMETRIA BINGHAM, RCP  
Lab called about coming to drawn blood cultures.    Electronically signed by Jessica L Kerley, RN on 6/12/2024 at 2:27 PM    
NAME:  Yenni Gagnon  YOB: 1980  MEDICAL RECORD NUMBER:  [id number]    Shift Summary: Pt not feeling well for multiple weeks. Pt fell 2 days and might have hit her head. Arianna found Pt AMS. Called 911. Seized in front EMS and seized again in route. Per, Arianna hasn't had seizures in three years. Intubated in ED for agitation. Found to be in DKA. BS: 916. Anion Gap: 26, CO2: 14. CODE STROKE Called in ED. Found to be in a fib RVR. 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 Plan for thrombectomy. Admitted to ICU. Head CT: possible hemorrhage. Heparin gtt stopped and thrombectomy cancelled. Critical Care, Neuro, Cardio, Neuro Surgery and Vascular Surgery consulted. On Fent, Prop, Amio, Cardizem, Insulin gtt.    Family updated: Yes:  arianna (Hair)    Rhythm: Atrial Fibrillation     Most recent vitals:   Visit Vitals  /83   Pulse (!) 136   Temp (!) 102.7 °F (39.3 °C) (Esophageal)   Resp 22   Ht 1.753 m (5' 9\")   Wt 102.3 kg (225 lb 8.5 oz)   SpO2 99%   BMI 33.31 kg/m²      ABP (Arterial line BP): 125/77  ABP Mean (Arterial Line Mean): 94 mmHg    No data found.    No data found.      Respiratory support needed (if any):  - Ventilator Settings:  Vent Days 1    Vt (Set, mL): 350 mL  Resp Rate (Set): 30 bpm  FiO2 : 60 %    PEEP/CPAP (cmH2O): 5       Admission weight Weight - Scale: 102.3 kg (225 lb 8.5 oz) (06/11/24 0925)    Today's weight    Wt Readings from Last 1 Encounters:   06/11/24 102.3 kg (225 lb 8.5 oz)        Trevino need assessed each shift: YES -  - continue trevino r/t - critical ill  UOP >30ml/hr: YES  Last documented BM (in last 48 hrs):  No data found.             Restraints (in use currently or dc'd in last 12 hrs): Yes    Order current and documentation up to date? Yes    Lines/Drains reviewed @ bedside.  CVC  06/11/24 Right Femoral (Active)   Number of days: 0       Peripheral IV 06/11/24 
NAME:  Yenni Gagnon  YOB: 1980  MEDICAL RECORD NUMBER:  [id number]    Shift Summary:   - BP trending down. Jackson gtt started.     - increasing O2 demands, increased FIO2 to 60%. She still has episodes of desaturation and is slow to recover. She desaturates w/ coughing or when she is agitated.    - Insulin gtt remains, waiting for morning labs to discontinue. Pt BS is now below 200.     - Repeat head CT completed. Neuro and vascular updated. No plans to move overnight, will do a repeat head CT in the morning to re-evaluate.     - Head CT to be done with day MD. Neuro recommends a paralytic be used for better imaging. Orders placed for paralytic, pharmacy will need to be contacted in advanced to mix medication and CT will need to be contacted to make sure they have a CT clear for us to use.     - Switches to antibiotics made, more changes to come after blood cultures had tested positive. Repeat blood cultures to be down during the day.     - HR remains 130s-140s, no longer Afib but SV tachy.     - Pt temperature improved with tylenol dose but quickly increased to a temp over 101. Cooling blanket applied.     - Propofol gtt changed to versed gtt in hopes to help BP.     - Pt agitated when stimulated. She starts to shake her head back and fourth rapidly. She also begins to flex her arms. She did open her eyes at one point in the evening and her eyes were looking up towards the ceiling. She did not follow commands.     Family updated: Yes:  Hair by Arthur PHAN and this RN     Rhythm: Supraventricular Tachycardia (SVT)     Most recent vitals:   Visit Vitals  BP (!) 87/55   Pulse (!) 135   Temp (!) 101.4 °F (38.6 °C) (Esophageal)   Resp 27   Ht 1.753 m (5' 9\")   Wt (S) 109.9 kg (242 lb 4.6 oz)   SpO2 (!) 89%   BMI 35.78 kg/m²      ABP (Arterial line BP): 90/58  ABP Mean (Arterial Line Mean): 70 mmHg    No data found.    No data found.      Respiratory support needed (if any):  - Ventilator Settings:  Vent Days 
Narcotic Waste Documentation    Administered 600 mg of Fentanyl and wasted 400 mg per [facility] policy.    Administered 20 mg of Versed and wasted 80 mg per [facility] policy.    Electronically signed by Jessica L Kerley, RN on 6/12/2024 at 5:32 PM    Electronically signed by Annette Holliday RN on 6/12/2024 at 5:34 PM   
Neurosurgery called again.     Electronically signed by Jessica L Kerley, RN on 6/11/2024 at 6:24 PM   
Neurosurgery recommending continuing off of heparin and maintain normotension. Also recommending repeat CT head in the morning with head restraint and NM paralysis.     Left message with on-call answering service vascular surgery to give update. Awaiting call back.   
New CT head showing hyperdensity along the right temporal horn which could represent abnormal contrast enhancement vs acute hemorrhage, motion artifact present which limits exam.    Left message for neurosurgery with CT read, waiting for call back.       
Patient broadened from cefepime to meropenem as patient has been spiking fevers despite getting tylenol while on cefepime and vanc. BP trending down with HR remaining high despite being on amiodarone and diltiazem.       
Pt is not following RN commands or opening eyes but she is rapidly shaking head at this time. Shaking head in a rapid back and forth motion.   
RN attempted to call Neurosurgery multiple times. Continuous busy signal. Will continue to try to call.    Electronically signed by Jessica L Kerley, RN on 6/12/2024 at 9:53 AM    
RN called Neurosurgery consult.    Electronically signed by Jessica L Kerley, RN on 6/11/2024 at 5:53 PM    
RN called report to  with report. Arianna Vincent) updated with room at . [facility] at bedside to transport patient.    Electronically signed by Jessica L Kerley, RN on 6/12/2024 at 3:53 PM    
RN spoke with Neurosurgery. Awaiting a call back.     Electronically signed by Jessica L Kerley, RN on 6/12/2024 at 10:05 AM    
RN spoke with Nomi Vaughn MD of  Neuro Surgery. Recommends repeat Head CT in 4 hours and no anticoagulation. Dio Lamas MD and Harish May MD made aware.     Electronically signed by Jessica L Kerley, RN on 6/11/2024 at 8:11 PM    
Report received fron ICU RN Ju. Pt is intubated, telephone consent from Hilario Gonzalez  
Secure message sent to Arthur PHAN to get orders for paralytic to go to CT.     MD wants to wait for day shift MD to push paralytic, CT will need to be obtained with day shift team. Arthur PHAN will place paralytic order.     CT updated and Day shift RN will need to call prior to coming down to be sure CT is available.   
Secure message to Lianna Ruelas NP Regarding Pt BP of 82/54 (63) on a-line. BP has been trending down since this RN arrived on unit.     Jackson gtt ordered.   
Secure message to Lianna Ruelas NP for her to advise if I am able to use NGT at this time. Results on chest x-ray were unclear.     NP gave okay to use NGT.   
Secure message to Lianna Ruelas NP regarding potassium replacement. Last K+ was 3.1, pt had received 10 meq of K and is currently receiving potassium phos replacement. This RN was wondering how much more K+ to give the patient.     NP ordered 40 meq more of potassium replacement.   
Secure message to Stephanie Gibson MD d/t a increased lactic of 1.9 and potassium of 5. Potassium phos is currently infusing at this time and this RN was curious of what the MD would like to replace.     Okay to evaluate potassium replacement at next draw per MD.   
Spoke to Hair Lundy, patient's fiancé on the phone.  Gave neurosurgery and vascular surgery updates.  Let him know that CT of the head was ordered for 7 AM and further steps will be taken after this.  
Tailor MD to attempt to call vascular surgery x2.     Once MD knows care plan she will update family.   
This RN called radiology group to get update on head CT.     Radiology was currently reading the scan and stated they would post results soon.   
This RN received a phone call from vascular surgery stating that they are okay with current treatment and have no new orders at this time. They will not be intervening this evening. MD states that they will evaluate in the morning. This RN updated that there are no pulses in lower extremity and that the patient has been started on a dawn gtt.   
This RN spoke on the phone with patients significant other, Favian. Hair was upset that he had not received an update on patients status and did not know she did not go into surgery. He was under the impression that she was still in surgery and was surprised he hadn't heard back.     This RN was able to answer most of the patients questions, some questions this RN could not provide the family d/t a doctor needing to provide answers. This RN updated Hair about there being a potential transfer to another facility pending most recent head CT. Also, the patient did not initially go to surgery d/t a previous head CT.     This RN has spoken with Stephanie Gibson MD and once we know full plan, MD will contact and update family.   
VASCULAR    CT head repeated and suggestive of R temporal intracerebral bleed.  Will need to be off heparin for this reason.  Awaiting Neurosurgery input.  L foot cool with 3-4 sec cap refill - neuro status of LLE unclear. Calf soft.  Foot appears viable at this time but without anticoagulation prognosis worsens.  Could proceed without use of heparin except for flushes.  May require transfer to higher level care for neuro monitoring.  Will follow.    Gerry May  
VASCULAR    Seen for L iliac embolism - original plan to proceed with thromboembolectomy late yesterday afternoon delayed by newly diagnosed R temporal bleed per neurosurgery recommendations. Remains off AC.  Overnight has required pressors and adjusted sedation.  Repeat CT head last night demonstrated previously noted changes - poor quality due to motion artifact.    SBP 85-95 (on 100 mcg/min Jackson) HR 130s (ST)  Tmax 101.4  Neuro exam limited by heavy sedation.  L foot - no audible doppler signals; pale compared to R with slow cap refill. L calf soft    Labs noted  + blood culture Glucose < 200    A/P: Acute L iliac embolism with LLE ischemia - foot appears viable at this time   Intracerebral hemorrhage R temporal lobe   AC contraindicated - in this case prognosis for worsened ischemia great   Ideally would attempt L iliac thromboembolectomy with only limited heparin flushes.   Timing dependent upon neurosurgery evaluation/plans/recommendations.   Spoke with Hair reddy) this AM and appraised of difficulties and plan.   Will follow.     Gerry May  
PM    Specimen: Nares   Result Value Ref Range    MRSA SCREEN RT-PCR       Negative  MRSA DNA not detected.  Normal Range: Not detected     Lactic Acid    Collection Time: 06/11/24  8:32 PM   Result Value Ref Range    Lactic Acid 2.5 (H) 0.4 - 2.0 mmol/L   POCT Glucose    Collection Time: 06/11/24  8:58 PM   Result Value Ref Range    POC Glucose 305 (H) 70 - 99 mg/dl    Performed on ACCU-CHEK    POCT Glucose    Collection Time: 06/11/24 10:07 PM   Result Value Ref Range    POC Glucose 278 (H) 70 - 99 mg/dl    Performed on ACCU-CHEK    Basic Metabolic Panel    Collection Time: 06/11/24 10:14 PM   Result Value Ref Range    Sodium 138 136 - 145 mmol/L    Potassium 3.8 3.5 - 5.1 mmol/L    Chloride 107 99 - 110 mmol/L    CO2 19 (L) 21 - 32 mmol/L    Anion Gap 12 3 - 16    Glucose 242 (H) 70 - 99 mg/dL    BUN 14 7 - 20 mg/dL    Creatinine 1.3 (H) 0.6 - 1.1 mg/dL    Est, Glom Filt Rate 52 (A) >60    Calcium 7.5 (L) 8.3 - 10.6 mg/dL   Magnesium    Collection Time: 06/11/24 10:14 PM   Result Value Ref Range    Magnesium 2.10 1.80 - 2.40 mg/dL   Phosphorus    Collection Time: 06/11/24 10:14 PM   Result Value Ref Range    Phosphorus 1.3 (L) 2.5 - 4.9 mg/dL   Lactic Acid    Collection Time: 06/11/24 10:14 PM   Result Value Ref Range    Lactic Acid 2.0 0.4 - 2.0 mmol/L   POCT Glucose    Collection Time: 06/11/24 11:02 PM   Result Value Ref Range    POC Glucose 261 (H) 70 - 99 mg/dl    Performed on ACCU-CHEK    Lactic Acid    Collection Time: 06/12/24 12:10 AM   Result Value Ref Range    Lactic Acid 1.5 0.4 - 2.0 mmol/L   POCT Glucose    Collection Time: 06/12/24 12:12 AM   Result Value Ref Range    POC Glucose 234 (H) 70 - 99 mg/dl    Performed on ACCU-CHEK    POCT Glucose    Collection Time: 06/12/24  1:08 AM   Result Value Ref Range    POC Glucose 224 (H) 70 - 99 mg/dl    Performed on ACCU-CHEK    Magnesium    Collection Time: 06/12/24  2:10 AM   Result Value Ref Range    Magnesium 2.10 1.80 - 2.40 mg/dL   Phosphorus

## 2024-06-13 LAB
EST. AVERAGE GLUCOSE BLD GHB EST-MCNC: 254.7 MG/DL
HBA1C MFR BLD: 10.5 %

## 2024-06-14 LAB
BACTERIA BLD CULT ORG #2: ABNORMAL
BACTERIA BLD CULT: ABNORMAL
BACTERIA BLD CULT: ABNORMAL
ORGANISM: ABNORMAL

## 2024-06-15 NOTE — PROCEDURES
INTERPRETATION:  This more than 2-hour, rapid 8-channel EEG recording is abnormal.   It showed moderate to severe continuous generalized slowing background activity.     The EEG findings were consistent with moderate to severe nonspecific generalized cerebral dysfunction or medication effect.    CLASSIFICATION:  Dysrhythmia grade 2, generalized.    DESCRIPTION:  BACKGROUND:  The EEG background showed continuous generalized low amplitude intermixed 2 to 7 Hz theta/delta activity with occasional EEG attenuation.  The EEG showed fair variability and reactivity.      INTERICTAL DISCHARGES: None    SEIZURE BURDEN: 3%

## (undated) DEVICE — SUTURE PERMAHAND SZ 2-0 L12X18IN NONABSORBABLE BLK SILK A185H

## (undated) DEVICE — STRIP,CLOSURE,WOUND,MEDI-STRIP,1/2X4: Brand: MEDLINE

## (undated) DEVICE — SOLUTION IRRIG 1000ML 0.9% SOD CHL USP POUR PLAS BTL

## (undated) DEVICE — Device: Brand: MEDEX

## (undated) DEVICE — TOWEL,OR,DSP,ST,BLUE,STD,4/PK,20PK/CS: Brand: MEDLINE

## (undated) DEVICE — 3M™ STERI-DRAPE™ ISOLATION BAG, 10 PER CARTON / 4 CARTONS PER CASE, 1003: Brand: 3M™ STERI-DRAPE™

## (undated) DEVICE — SHEET,DRAPE,53X77,STERILE: Brand: MEDLINE

## (undated) DEVICE — TUBING ART PRSS L36IN

## (undated) DEVICE — SUTURE PERMAHAND SZ 2-0 L18IN NONABSORBABLE BLK L26MM SH C012D

## (undated) DEVICE — GLOVE,SURG,SENSICARE SLT,LF,PF,7: Brand: MEDLINE

## (undated) DEVICE — SPONGE GZ W4XL4IN COT 12 PLY TYP VII WVN C FLD DSGN STERILE

## (undated) DEVICE — SUTURE PROL SZ 7-0 L18IN NONABSORBABLE BLU L9.3MM BV-1 3/8 8701H

## (undated) DEVICE — C-ARM: Brand: UNBRANDED

## (undated) DEVICE — APPLIER LIG CLP M L11IN TI STR RNG HNDL FOR 20 CLP DISP

## (undated) DEVICE — AGENT HEMOSTATIC SURG ORIGINAL ABS 4X8IN LOOSE KNIT 12/CA

## (undated) DEVICE — SOLUTION IV 1000ML 0.9% SOD CHL PH 5 INJ USP VIAFLX PLAS

## (undated) DEVICE — GAUZE,SPONGE,4"X4",16PLY,XRAY,STRL,LF: Brand: MEDLINE

## (undated) DEVICE — MAJOR VASCULAR: Brand: MEDLINE INDUSTRIES, INC.

## (undated) DEVICE — 1LYRTR 16FR10ML100%SIL UMS SNP: Brand: MEDLINE INDUSTRIES, INC.

## (undated) DEVICE — BANDAGE COMPR W4INXL15FT BGE E SGL LAYERED CLP CLSR

## (undated) DEVICE — DRAPE,LAP,CHOLE,W/TROUGHS,STERILE: Brand: MEDLINE

## (undated) DEVICE — SYRINGE MEDICAL 3ML CLEAR PLASTIC STANDARD NON CONTROL LUERLOCK TIP DISPOSABLE

## (undated) DEVICE — SUTURE PERMAHAND SZ 4-0 L18IN NONABSORBABLE BLK SILK BRAID A183H

## (undated) DEVICE — SYRINGE MED 30ML STD CLR PLAS LUERLOCK TIP N CTRL DISP

## (undated) DEVICE — SUTURE PROL SZ 5-0 L18IN NONABSORBABLE BLU C-1 L13MM 3/8 8717H

## (undated) DEVICE — 3M™ TEGADERM™ TRANSPARENT FILM DRESSING FRAME STYLE, 1626W, 4 IN X 4-3/4 IN (10 CM X 12 CM), 50/CT 4CT/CASE: Brand: 3M™ TEGADERM™

## (undated) DEVICE — MERCY HEALTH WEST TURNOVER: Brand: MEDLINE INDUSTRIES, INC.

## (undated) DEVICE — DECANTER BTL 6IN: Brand: MEDLINE INDUSTRIES, INC.

## (undated) DEVICE — MONOJECT MAGELLAN 1 ML TUBERCULIN SAFETY SYRINGE PERMANENT NEEDLE 27G X1/2 IN. (0.4 X 13 MM): Brand: MONOJECT

## (undated) DEVICE — FOGARTY - HYDRAGRIP SURGICAL - CLAMP INSERTS: Brand: FOGARTY SOFTJAW

## (undated) DEVICE — SUTURE PROL SZ 6-0 L24IN NONABSORBABLE BLU L13MM C-1 3/8 8726H

## (undated) DEVICE — ELECTRODE PT RET AD L9FT HI MOIST COND ADH HYDRGEL CORDED

## (undated) DEVICE — ZIMMER® STERILE DISPOSABLE TOURNIQUET CUFF WITH PLC, DUAL PORT, SINGLE BLADDER, 34 IN. (86 CM)

## (undated) DEVICE — SUTURE PERMA-HAND SZ 2-0 L30IN NONABSORBABLE BLK L26MM SH K833H

## (undated) DEVICE — SUTURE VICRYL + SZ 3-0 L36IN ABSRB UD L36MM CT-1 1/2 CIR VCP944H

## (undated) DEVICE — SUTURE VICRYL + SZ 4-0 L27IN ABSRB WHT FS-2 3/8 CIR REV CUT VCP422H